# Patient Record
Sex: FEMALE | Race: WHITE | Employment: OTHER | URBAN - METROPOLITAN AREA
[De-identification: names, ages, dates, MRNs, and addresses within clinical notes are randomized per-mention and may not be internally consistent; named-entity substitution may affect disease eponyms.]

---

## 2023-07-14 ENCOUNTER — HOSPITAL ENCOUNTER (OUTPATIENT)
Age: 58
Discharge: HOME | End: 2023-07-14
Payer: MEDICARE

## 2023-07-14 VITALS
DIASTOLIC BLOOD PRESSURE: 78 MMHG | TEMPERATURE: 98 F | SYSTOLIC BLOOD PRESSURE: 133 MMHG | RESPIRATION RATE: 20 BRPM | HEART RATE: 62 BPM | OXYGEN SATURATION: 97 %

## 2023-07-14 DIAGNOSIS — N30.90 CYSTITIS: Primary | ICD-10-CM

## 2023-07-14 LAB
BILIRUB UR QL STRIP: NEGATIVE
COLOR UR: YELLOW
GLUCOSE UR STRIP-MCNC: NEGATIVE MG/DL
HGB UR QL STRIP: NEGATIVE
KETONES UR STRIP-MCNC: NEGATIVE MG/DL
NITRITE UR QL STRIP: NEGATIVE
PH UR STRIP: 6 [PH]
PROT UR STRIP-MCNC: NEGATIVE MG/DL
SP GR UR STRIP: 1.02
UROBILINOGEN UR STRIP-ACNC: <2 MG/DL

## 2023-07-14 PROCEDURE — 87086 URINE CULTURE/COLONY COUNT: CPT | Performed by: NURSE PRACTITIONER

## 2023-07-14 PROCEDURE — 99204 OFFICE O/P NEW MOD 45 MIN: CPT

## 2023-07-14 PROCEDURE — 81002 URINALYSIS NONAUTO W/O SCOPE: CPT

## 2023-07-14 RX ORDER — CEPHALEXIN 500 MG/1
500 CAPSULE ORAL 2 TIMES DAILY
Qty: 14 CAPSULE | Refills: 0 | Status: SHIPPED | OUTPATIENT
Start: 2023-07-14 | End: 2023-07-21

## 2023-07-14 RX ORDER — LEVOTHYROXINE SODIUM 137 MCG
137 TABLET ORAL DAILY
COMMUNITY
Start: 2023-06-01

## 2023-07-14 RX ORDER — VENLAFAXINE HYDROCHLORIDE 150 MG/1
300 CAPSULE, EXTENDED RELEASE ORAL DAILY
COMMUNITY
Start: 2023-06-22

## 2023-07-14 RX ORDER — PHENAZOPYRIDINE HYDROCHLORIDE 100 MG/1
100 TABLET, FILM COATED ORAL 3 TIMES DAILY PRN
Qty: 6 TABLET | Refills: 0 | Status: SHIPPED | OUTPATIENT
Start: 2023-07-14 | End: 2023-07-21

## 2023-07-14 RX ORDER — TRAZODONE HYDROCHLORIDE 300 MG/1
TABLET ORAL
COMMUNITY
Start: 2023-06-21

## 2023-07-14 NOTE — DISCHARGE INSTRUCTIONS
Please take medication as prescribed. Close follow-up is recommended. Any vomiting, fever, please go to the emergency department.

## 2023-07-14 NOTE — ED INITIAL ASSESSMENT (HPI)
Pt c/o urinary frequency/urgency, low back pain and discomfort with urination x 2 weeks. Denies, fever/chills or abdomen pain.

## 2023-09-13 ENCOUNTER — OFFICE VISIT (OUTPATIENT)
Dept: INTERNAL MEDICINE CLINIC | Facility: CLINIC | Age: 58
End: 2023-09-13

## 2023-09-13 VITALS
OXYGEN SATURATION: 98 % | DIASTOLIC BLOOD PRESSURE: 82 MMHG | SYSTOLIC BLOOD PRESSURE: 126 MMHG | HEART RATE: 83 BPM | BODY MASS INDEX: 20.61 KG/M2 | WEIGHT: 136 LBS | HEIGHT: 68 IN

## 2023-09-13 DIAGNOSIS — K58.1 IRRITABLE BOWEL SYNDROME WITH CONSTIPATION: ICD-10-CM

## 2023-09-13 DIAGNOSIS — K21.9 GASTROESOPHAGEAL REFLUX DISEASE, UNSPECIFIED WHETHER ESOPHAGITIS PRESENT: ICD-10-CM

## 2023-09-13 DIAGNOSIS — J30.2 SEASONAL ALLERGIC RHINITIS, UNSPECIFIED TRIGGER: Primary | ICD-10-CM

## 2023-09-13 DIAGNOSIS — J45.40 MODERATE PERSISTENT ASTHMA WITHOUT COMPLICATION: ICD-10-CM

## 2023-09-13 DIAGNOSIS — Z00.00 ENCOUNTER FOR MEDICAL EXAMINATION TO ESTABLISH CARE: ICD-10-CM

## 2023-09-13 PROBLEM — M54.2 NECK PAIN: Status: ACTIVE | Noted: 2022-07-20

## 2023-09-13 PROBLEM — F10.20 ALCOHOL USE DISORDER, MODERATE, DEPENDENCE (HCC): Status: ACTIVE | Noted: 2022-12-29

## 2023-09-13 PROBLEM — N39.0 UTI (URINARY TRACT INFECTION): Status: RESOLVED | Noted: 2018-06-20 | Resolved: 2023-09-13

## 2023-09-13 PROBLEM — R10.9 FLANK PAIN: Status: RESOLVED | Noted: 2018-06-20 | Resolved: 2023-09-13

## 2023-09-13 PROBLEM — E55.9 VITAMIN D DEFICIENCY: Status: RESOLVED | Noted: 2022-07-22 | Resolved: 2023-09-13

## 2023-09-13 PROBLEM — M27.9 LESION OF MANDIBLE: Status: RESOLVED | Noted: 2019-07-01 | Resolved: 2023-09-13

## 2023-09-13 PROBLEM — F41.9 ANXIETY: Status: ACTIVE | Noted: 2018-09-17

## 2023-09-13 PROBLEM — F41.1 GENERALIZED ANXIETY DISORDER: Status: ACTIVE | Noted: 2021-07-22

## 2023-09-13 PROBLEM — F33.1 MAJOR DEPRESSIVE DISORDER, RECURRENT EPISODE, MODERATE (HCC): Chronic | Status: RESOLVED | Noted: 2019-05-13 | Resolved: 2023-09-13

## 2023-09-13 PROBLEM — J34.2 DEVIATED SEPTUM: Status: RESOLVED | Noted: 2020-11-24 | Resolved: 2023-09-13

## 2023-09-13 PROBLEM — R10.84 DIFFUSE ABDOMINAL PAIN: Status: RESOLVED | Noted: 2018-06-20 | Resolved: 2023-09-13

## 2023-09-13 PROBLEM — M54.50 ACUTE BILATERAL LOW BACK PAIN WITHOUT SCIATICA: Status: ACTIVE | Noted: 2022-07-20

## 2023-09-13 PROBLEM — F32.A DEPRESSIVE DISORDER: Status: ACTIVE | Noted: 2018-05-18

## 2023-09-13 PROBLEM — F43.10 PTSD (POST-TRAUMATIC STRESS DISORDER): Status: ACTIVE | Noted: 2021-07-22

## 2023-09-13 PROBLEM — F12.10 CANNABIS ABUSE: Status: ACTIVE | Noted: 2023-01-25

## 2023-09-13 PROCEDURE — 3074F SYST BP LT 130 MM HG: CPT

## 2023-09-13 PROCEDURE — 3079F DIAST BP 80-89 MM HG: CPT

## 2023-09-13 PROCEDURE — 99204 OFFICE O/P NEW MOD 45 MIN: CPT

## 2023-09-13 PROCEDURE — 3008F BODY MASS INDEX DOCD: CPT

## 2023-09-13 RX ORDER — ALPRAZOLAM 0.5 MG/1
0.5 TABLET ORAL NIGHTLY
COMMUNITY

## 2023-09-13 RX ORDER — OMEPRAZOLE 40 MG/1
40 CAPSULE, DELAYED RELEASE ORAL DAILY
COMMUNITY
End: 2023-09-13

## 2023-09-13 RX ORDER — ALBUTEROL SULFATE 90 UG/1
2 AEROSOL, METERED RESPIRATORY (INHALATION) EVERY 6 HOURS PRN
Qty: 8 G | Refills: 1 | Status: SHIPPED | OUTPATIENT
Start: 2023-09-13

## 2023-09-13 RX ORDER — ALBUTEROL SULFATE 90 UG/1
2 AEROSOL, METERED RESPIRATORY (INHALATION) EVERY 6 HOURS PRN
COMMUNITY
End: 2023-09-13

## 2023-09-13 RX ORDER — DICYCLOMINE HYDROCHLORIDE 10 MG/1
1 CAPSULE ORAL 4 TIMES DAILY PRN
COMMUNITY
Start: 2022-07-13 | End: 2023-09-13

## 2023-09-13 RX ORDER — BUSPIRONE HYDROCHLORIDE 10 MG/1
10 TABLET ORAL 3 TIMES DAILY
COMMUNITY
Start: 2023-08-16

## 2023-09-13 RX ORDER — FLUTICASONE PROPIONATE 50 MCG
2 SPRAY, SUSPENSION (ML) NASAL 2 TIMES DAILY
Qty: 15.8 ML | Refills: 1 | Status: SHIPPED | OUTPATIENT
Start: 2023-09-13

## 2023-09-13 RX ORDER — DICYCLOMINE HYDROCHLORIDE 10 MG/1
10 CAPSULE ORAL 4 TIMES DAILY PRN
Qty: 120 CAPSULE | Refills: 1 | Status: SHIPPED | OUTPATIENT
Start: 2023-09-13

## 2023-09-13 RX ORDER — ARIPIPRAZOLE 5 MG/1
5 TABLET ORAL DAILY
COMMUNITY

## 2023-09-13 RX ORDER — OMEPRAZOLE 40 MG/1
40 CAPSULE, DELAYED RELEASE ORAL DAILY
Qty: 90 CAPSULE | Refills: 0 | Status: SHIPPED | OUTPATIENT
Start: 2023-09-13

## 2023-09-13 RX ORDER — FLUTICASONE PROPIONATE 50 MCG
2 SPRAY, SUSPENSION (ML) NASAL 2 TIMES DAILY
COMMUNITY
End: 2023-09-13

## 2023-09-14 RX ORDER — DICYCLOMINE HYDROCHLORIDE 10 MG/1
10 CAPSULE ORAL 4 TIMES DAILY PRN
Qty: 360 CAPSULE | Refills: 0 | OUTPATIENT
Start: 2023-09-14

## 2023-09-15 ENCOUNTER — TELEPHONE (OUTPATIENT)
Dept: FAMILY MEDICINE CLINIC | Facility: CLINIC | Age: 58
End: 2023-09-15

## 2023-09-15 DIAGNOSIS — J01.90 ACUTE RHINOSINUSITIS: Primary | ICD-10-CM

## 2023-09-15 RX ORDER — AMOXICILLIN AND CLAVULANATE POTASSIUM 875; 125 MG/1; MG/1
1 TABLET, FILM COATED ORAL 2 TIMES DAILY
Qty: 14 TABLET | Refills: 0 | Status: SHIPPED | OUTPATIENT
Start: 2023-09-15 | End: 2023-09-22

## 2023-09-23 ENCOUNTER — LAB ENCOUNTER (OUTPATIENT)
Dept: LAB | Age: 58
End: 2023-09-23
Payer: MEDICARE

## 2023-09-23 DIAGNOSIS — Z00.00 ENCOUNTER FOR MEDICAL EXAMINATION TO ESTABLISH CARE: ICD-10-CM

## 2023-09-23 LAB
ALBUMIN SERPL-MCNC: 3.9 G/DL (ref 3.4–5)
ALBUMIN/GLOB SERPL: 0.9 {RATIO} (ref 1–2)
ALP LIVER SERPL-CCNC: 86 U/L
ALT SERPL-CCNC: 40 U/L
ANION GAP SERPL CALC-SCNC: 7 MMOL/L (ref 0–18)
AST SERPL-CCNC: 33 U/L (ref 15–37)
BASOPHILS # BLD AUTO: 0.04 X10(3) UL (ref 0–0.2)
BASOPHILS NFR BLD AUTO: 0.8 %
BILIRUB SERPL-MCNC: 0.3 MG/DL (ref 0.1–2)
BUN BLD-MCNC: 13 MG/DL (ref 7–18)
BUN/CREAT SERPL: 13.7 (ref 10–20)
CALCIUM BLD-MCNC: 9.2 MG/DL (ref 8.5–10.1)
CHLORIDE SERPL-SCNC: 107 MMOL/L (ref 98–112)
CHOLEST SERPL-MCNC: 268 MG/DL (ref ?–200)
CO2 SERPL-SCNC: 28 MMOL/L (ref 21–32)
CREAT BLD-MCNC: 0.95 MG/DL
DEPRECATED RDW RBC AUTO: 45.9 FL (ref 35.1–46.3)
EGFRCR SERPLBLD CKD-EPI 2021: 69 ML/MIN/1.73M2 (ref 60–?)
EOSINOPHIL # BLD AUTO: 0.15 X10(3) UL (ref 0–0.7)
EOSINOPHIL NFR BLD AUTO: 2.9 %
ERYTHROCYTE [DISTWIDTH] IN BLOOD BY AUTOMATED COUNT: 12.9 % (ref 11–15)
FASTING PATIENT LIPID ANSWER: YES
FASTING STATUS PATIENT QL REPORTED: YES
GLOBULIN PLAS-MCNC: 4.2 G/DL (ref 2.8–4.4)
GLUCOSE BLD-MCNC: 97 MG/DL (ref 70–99)
HCT VFR BLD AUTO: 43.1 %
HDLC SERPL-MCNC: 72 MG/DL (ref 40–59)
HGB BLD-MCNC: 14.2 G/DL
IMM GRANULOCYTES # BLD AUTO: 0.01 X10(3) UL (ref 0–1)
IMM GRANULOCYTES NFR BLD: 0.2 %
LDLC SERPL CALC-MCNC: 176 MG/DL (ref ?–100)
LYMPHOCYTES # BLD AUTO: 2.66 X10(3) UL (ref 1–4)
LYMPHOCYTES NFR BLD AUTO: 52.2 %
MCH RBC QN AUTO: 32.2 PG (ref 26–34)
MCHC RBC AUTO-ENTMCNC: 32.9 G/DL (ref 31–37)
MCV RBC AUTO: 97.7 FL
MONOCYTES # BLD AUTO: 0.39 X10(3) UL (ref 0.1–1)
MONOCYTES NFR BLD AUTO: 7.6 %
NEUTROPHILS # BLD AUTO: 1.85 X10 (3) UL (ref 1.5–7.7)
NEUTROPHILS # BLD AUTO: 1.85 X10(3) UL (ref 1.5–7.7)
NEUTROPHILS NFR BLD AUTO: 36.3 %
NONHDLC SERPL-MCNC: 196 MG/DL (ref ?–130)
OSMOLALITY SERPL CALC.SUM OF ELEC: 294 MOSM/KG (ref 275–295)
PLATELET # BLD AUTO: 230 10(3)UL (ref 150–450)
POTASSIUM SERPL-SCNC: 4.1 MMOL/L (ref 3.5–5.1)
PROT SERPL-MCNC: 8.1 G/DL (ref 6.4–8.2)
RBC # BLD AUTO: 4.41 X10(6)UL
SODIUM SERPL-SCNC: 142 MMOL/L (ref 136–145)
TRIGL SERPL-MCNC: 117 MG/DL (ref 30–149)
TSI SER-ACNC: 2.62 MIU/ML (ref 0.36–3.74)
VLDLC SERPL CALC-MCNC: 24 MG/DL (ref 0–30)
WBC # BLD AUTO: 5.1 X10(3) UL (ref 4–11)

## 2023-09-23 PROCEDURE — 84443 ASSAY THYROID STIM HORMONE: CPT

## 2023-09-23 PROCEDURE — 85025 COMPLETE CBC W/AUTO DIFF WBC: CPT

## 2023-09-23 PROCEDURE — 80061 LIPID PANEL: CPT

## 2023-09-23 PROCEDURE — 36415 COLL VENOUS BLD VENIPUNCTURE: CPT

## 2023-09-23 PROCEDURE — 80053 COMPREHEN METABOLIC PANEL: CPT

## 2023-09-29 ENCOUNTER — OFFICE VISIT (OUTPATIENT)
Dept: INTERNAL MEDICINE CLINIC | Facility: CLINIC | Age: 58
End: 2023-09-29

## 2023-09-29 VITALS
OXYGEN SATURATION: 98 % | HEIGHT: 68 IN | SYSTOLIC BLOOD PRESSURE: 123 MMHG | HEART RATE: 77 BPM | DIASTOLIC BLOOD PRESSURE: 85 MMHG | WEIGHT: 137 LBS | BODY MASS INDEX: 20.76 KG/M2

## 2023-09-29 DIAGNOSIS — Z12.31 SCREENING MAMMOGRAM FOR BREAST CANCER: ICD-10-CM

## 2023-09-29 DIAGNOSIS — Z00.00 ROUTINE GENERAL MEDICAL EXAMINATION AT A HEALTH CARE FACILITY: Primary | ICD-10-CM

## 2023-09-29 DIAGNOSIS — K21.9 GASTROESOPHAGEAL REFLUX DISEASE, UNSPECIFIED WHETHER ESOPHAGITIS PRESENT: ICD-10-CM

## 2023-09-29 DIAGNOSIS — Z23 NEED FOR VACCINATION: ICD-10-CM

## 2023-09-29 DIAGNOSIS — E78.00 PURE HYPERCHOLESTEROLEMIA: ICD-10-CM

## 2023-09-29 PROCEDURE — 99396 PREV VISIT EST AGE 40-64: CPT

## 2023-09-29 PROCEDURE — 90471 IMMUNIZATION ADMIN: CPT

## 2023-09-29 PROCEDURE — 3079F DIAST BP 80-89 MM HG: CPT

## 2023-09-29 PROCEDURE — 3074F SYST BP LT 130 MM HG: CPT

## 2023-09-29 PROCEDURE — 90472 IMMUNIZATION ADMIN EACH ADD: CPT

## 2023-09-29 PROCEDURE — 90677 PCV20 VACCINE IM: CPT

## 2023-09-29 PROCEDURE — 90750 HZV VACC RECOMBINANT IM: CPT

## 2023-09-29 PROCEDURE — 3008F BODY MASS INDEX DOCD: CPT

## 2023-09-29 RX ORDER — ROSUVASTATIN CALCIUM 5 MG/1
5 TABLET, COATED ORAL NIGHTLY
Qty: 90 TABLET | Refills: 0 | Status: SHIPPED | OUTPATIENT
Start: 2023-09-29

## 2023-09-29 RX ORDER — OMEPRAZOLE 40 MG/1
40 CAPSULE, DELAYED RELEASE ORAL DAILY
Qty: 90 CAPSULE | Refills: 0 | Status: CANCELLED | OUTPATIENT
Start: 2023-09-29

## 2023-10-04 ENCOUNTER — NURSE TRIAGE (OUTPATIENT)
Dept: INTERNAL MEDICINE CLINIC | Facility: CLINIC | Age: 58
End: 2023-10-04

## 2023-10-04 ENCOUNTER — TELEMEDICINE (OUTPATIENT)
Dept: INTERNAL MEDICINE CLINIC | Facility: CLINIC | Age: 58
End: 2023-10-04

## 2023-10-04 DIAGNOSIS — T50.B95A: Primary | ICD-10-CM

## 2023-10-04 PROCEDURE — 99213 OFFICE O/P EST LOW 20 MIN: CPT

## 2023-10-04 RX ORDER — DIPHENHYDRAMINE HYDROCHLORIDE, ZINC ACETATE 2; .1 G/100G; G/100G
1 CREAM TOPICAL 3 TIMES DAILY PRN
Qty: 28 G | Refills: 0 | Status: SHIPPED | OUTPATIENT
Start: 2023-10-04

## 2023-10-04 RX ORDER — METHYLPREDNISOLONE 4 MG/1
TABLET ORAL
Qty: 1 EACH | Refills: 0 | Status: SHIPPED | OUTPATIENT
Start: 2023-10-04

## 2023-10-04 NOTE — TELEPHONE ENCOUNTER
Action Requested: Summary for Provider     []  Critical Lab, Recommendations Needed  [] Need Additional Advice  []   FYI    []   Need Orders  [] Need Medications Sent to Pharmacy  []  Other     SUMMARY: Per protocol: OV. Patient does not have a car. Virtual visit made. Future Appointments   Date Time Provider Shivam Francisca   10/4/2023  6:00 PM JEROME Chiu [facility]     Reason for call: Rash  Onset: Data Unavailable      Patient had both shingles and a pneumonia vaccine done on 9/29/23. There is now a rash from her upper arm to her elbow joint. The rash is spreading daily. It is painful and it itches. She denies fever or shortness of breath.      Reason for Disposition   Patient wants to be seen    Protocols used: Rash or Redness - [id number]

## 2023-10-04 NOTE — PROGRESS NOTES
This is a telemedicine visit with live, interactive video and audio. Patient understands and accepts financial responsibility for any deductible, co-insurance and/or co-pays associated with this service. SUBJECTIVE  Developed a local reaction to the shingles vaccine- also got the pneumonia vaccine in the same arm  It is painful and itchy, red and raised  The redness is spreading down to elbow   Pain is about 8/10 at the worst  She has been icing it and using tylenol and ibuprofen without much relief  No fevers  Had an occasional chill    HISTORY:  Past Medical History:   Diagnosis Date    Cholecystitis     Colitis     Deviated septum 11/24/2020    Diffuse abdominal pain 06/20/2018    Last Assessment & Plan: Formatting of this note might be different from the original. Patient with chronic abdominal pain with negative workup in the past including EGD in 2015. Will prescribe Dicyclomine as suspect there may be some degree of IBS.  Will also treat her GERD as below    Diverticulitis     Hernia     History of IBS     Lesion of mandible 07/01/2019    UTI (urinary tract infection) 06/20/2018      Past Surgical History:   Procedure Laterality Date    CHOLECYSTECTOMY      HYSTERECTOMY      TONSILLECTOMY        Family History   Problem Relation Age of Onset    Other (cervical cancer) Mother     Other (HIV) Father     Other (addiction) Father     Breast Cancer Paternal Grandmother     Cancer Son       Social History     Socioeconomic History    Marital status:    Tobacco Use    Smoking status: Every Day     Packs/day: .5     Types: Cigarettes   Vaping Use    Vaping Use: Never used   Substance and Sexual Activity    Alcohol use: Never    Drug use: Never   Social History Narrative    ** Merged History Encounter **               Quetiapine              NAUSEA AND VOMITING    Comment:Heartburn  Morphine                ITCHING  Acetaminophen-Codei*    ITCHING   Current Outpatient Medications   Medication Sig Dispense Refill    diphenhydrAMINE-zinc (BENADRYL EXTRA STRENGTH) 2-0.1 % External Cream Apply 1 Application topically 3 (three) times daily as needed for Itching. 28 g 0    methylPREDNISolone (MEDROL) 4 MG Oral Tablet Therapy Pack As directed. 1 each 0    rosuvastatin 5 MG Oral Tab Take 1 tablet (5 mg total) by mouth nightly. 90 tablet 0    ALPRAZolam 0.5 MG Oral Tab Take 1 tablet (0.5 mg total) by mouth nightly. TAKE AT BEDTIME      busPIRone 10 MG Oral Tab Take 1 tablet (10 mg total) by mouth 3 (three) times daily. dicyclomine 10 MG Oral Cap Take 1 capsule (10 mg total) by mouth 4 (four) times daily as needed. 120 capsule 1    Omeprazole 40 MG Oral Capsule Delayed Release Take 1 capsule (40 mg total) by mouth daily. 90 capsule 0    albuterol 108 (90 Base) MCG/ACT Inhalation Aero Soln Inhale 2 puffs into the lungs every 6 (six) hours as needed for Wheezing. 8 g 1    fluticasone propionate 50 MCG/ACT Nasal Suspension 2 sprays by Nasal route 2 (two) times daily. 15.8 mL 1    SYNTHROID 137 MCG Oral Tab Take 137 mcg by mouth daily. traZODone HCl 300 MG Oral Tab       venlafaxine  MG Oral Capsule SR 24 Hr Take 2 capsules (300 mg total) by mouth daily. OBJECTIVE  Physical Exam:   alert, appears stated age, cooperative, and no distress  Raised red rash from deltoid down the arm close to the elbow    ASSESSMENT & PLAN  Diagnoses and all orders for this visit:    Adverse reaction to viral vaccines, initial encounter  -     diphenhydrAMINE-zinc (BENADRYL EXTRA STRENGTH) 2-0.1 % External Cream; Apply 1 Application topically 3 (three) times daily as needed for Itching.  -     methylPREDNISolone (MEDROL) 4 MG Oral Tablet Therapy Pack; As directed. Make a follow up appointment on Friday in person. If she develops signs of infection should be seen sooner - discussed these symptoms.      Ha Life, APRN

## 2023-10-06 ENCOUNTER — OFFICE VISIT (OUTPATIENT)
Dept: INTERNAL MEDICINE CLINIC | Facility: CLINIC | Age: 58
End: 2023-10-06

## 2023-10-06 VITALS
SYSTOLIC BLOOD PRESSURE: 130 MMHG | HEIGHT: 68 IN | WEIGHT: 134 LBS | BODY MASS INDEX: 20.31 KG/M2 | HEART RATE: 76 BPM | OXYGEN SATURATION: 96 % | DIASTOLIC BLOOD PRESSURE: 82 MMHG

## 2023-10-06 DIAGNOSIS — L29.8: ICD-10-CM

## 2023-10-06 DIAGNOSIS — T50.Z95A: ICD-10-CM

## 2023-10-06 DIAGNOSIS — T50.B95A: Primary | ICD-10-CM

## 2023-10-06 PROCEDURE — 99214 OFFICE O/P EST MOD 30 MIN: CPT

## 2023-10-06 RX ORDER — HYDROXYZINE HYDROCHLORIDE 25 MG/1
25 TABLET, FILM COATED ORAL 3 TIMES DAILY PRN
Qty: 21 TABLET | Refills: 0 | Status: SHIPPED | OUTPATIENT
Start: 2023-10-06

## 2023-12-19 DIAGNOSIS — K21.9 GASTROESOPHAGEAL REFLUX DISEASE, UNSPECIFIED WHETHER ESOPHAGITIS PRESENT: ICD-10-CM

## 2023-12-20 RX ORDER — OMEPRAZOLE 40 MG/1
40 CAPSULE, DELAYED RELEASE ORAL DAILY
Qty: 90 CAPSULE | Refills: 3 | Status: SHIPPED | OUTPATIENT
Start: 2023-12-20

## 2023-12-20 NOTE — TELEPHONE ENCOUNTER
Refill passed per [facility] protocol.   Requested Prescriptions   Pending Prescriptions Disp Refills    OMEPRAZOLE 40 MG Oral Capsule Delayed Release [Pharmacy Med Name: OMEPRAZOLE 40MG CAPSULES] 90 capsule 0     Sig: TAKE 1 CAPSULE(40 MG) BY MOUTH DAILY       Gastrointestional Medication Protocol Passed - 12/19/2023  2:45 PM        Passed - In person appointment or virtual visit in the past 12 mos or appointment in next 3 mos     Recent Outpatient Visits              2 months ago Adverse reaction to viral vaccines, initial encounter    Ashleigh Leiva, [facility], APRN    Office Visit    2 months ago Adverse reaction to viral vaccines, initial encounter    Ashleigh Leiva, Celine Cohen, APRN    Telemedicine    2 months ago Routine general medical examination at a health care facility    Teri Sloan [address] [place] [address], [address] [place] [address],[address], [facility], APRN    Office Visit    3 months ago Seasonal allergic rhinitis, unspecified trigger    Mohamud Coyle, APRN    Office Visit                         Recent Outpatient Visits              2 months ago Adverse reaction to viral vaccines, initial encounter    Teri Sloan, [address] [facility], [address] [facility] [address] [facility] [address], APRN    Office Visit    2 months ago Adverse reaction to viral vaccines, initial encounter    Teri Sloan [address] [place], [place] [address]Mohamud manuel Sarah, APRN    Telemedicine    2 months ago Routine general medical examination at a health care facility    Teri Sloan [address] [facility], [address] [facility],[address], [address] [facility], APRN    Office Visit    3 months ago Seasonal allergic rhinitis, unspecified trigger    Ashleigh Leiva, [address] [place] [address],[address], [address] [facility], APRN    Office Visit

## 2024-01-08 DIAGNOSIS — E78.00 PURE HYPERCHOLESTEROLEMIA: ICD-10-CM

## 2024-01-09 NOTE — TELEPHONE ENCOUNTER
Please Review. Protocol Failed or has no protocol.     Last visit - rosuvastatin was started       Requested Prescriptions   Pending Prescriptions Disp Refills    ROSUVASTATIN 5 MG Oral Tab [Pharmacy Med Name: ROSUVASTATIN 5MG TABLETS] 90 tablet 0     Sig: TAKE 1 TABLET(5 MG) BY MOUTH EVERY NIGHT       Cholesterol Medication Protocol Failed - 1/8/2024  8:34 AM        Failed - Last LDL < 130     Lab Results   Component Value Date     (H) 09/23/2023             Passed - ALT in past 12 months        Passed - LDL in past 12 months        Passed - Last ALT < 80     Lab Results   Component Value Date    ALT 40 09/23/2023             Passed - In person appointment or virtual visit in the past 12 mos or appointment in next 3 mos     Recent Outpatient Visits              3 months ago Adverse reaction to viral vaccines, initial encounter    [facility]    Office Visit    3 months ago Adverse reaction to viral vaccines, initial encounter    [facility]    Telemedicine    3 months ago Routine general medical examination at a health care facility    [facility]    Office Visit    3 months ago Seasonal allergic rhinitis, unspecified trigger    [facility]    Office Visit                         Recent Outpatient Visits              3 months ago Adverse reaction to viral vaccines, initial encounter    [facility]    Office Visit    3 months ago Adverse reaction to viral vaccines, initial encounter    [facility]    Telemedicine    3 months ago Routine general medical examination at a health care facility    [facility]  Charles, Celine Hoover APRN    Office Visit    3 months ago Seasonal allergic rhinitis, unspecified trigger    [facility], [facility] [place], Celine Hoover APRN    Office Visit

## 2024-01-10 RX ORDER — ROSUVASTATIN CALCIUM 5 MG/1
5 TABLET, COATED ORAL NIGHTLY
Qty: 90 TABLET | Refills: 0 | Status: SHIPPED | OUTPATIENT
Start: 2024-01-10

## 2024-04-10 ENCOUNTER — NURSE TRIAGE (OUTPATIENT)
Dept: FAMILY MEDICINE CLINIC | Facility: CLINIC | Age: 59
End: 2024-04-10

## 2024-04-10 ENCOUNTER — TELEPHONE (OUTPATIENT)
Dept: FAMILY MEDICINE CLINIC | Facility: CLINIC | Age: 59
End: 2024-04-10

## 2024-04-10 DIAGNOSIS — E78.00 PURE HYPERCHOLESTEROLEMIA: ICD-10-CM

## 2024-04-10 NOTE — TELEPHONE ENCOUNTER
Patient was triaged today she has an appointment today in [facility].    Future Appointments   Date Time Provider Department Center   4/10/2024  3:20 PM [facility]

## 2024-04-10 NOTE — TELEPHONE ENCOUNTER
Patient called to request an appointment with Dr. Miles this week if possible.     Patient advised she  has back pain and trouble with her hands.     Patient states she currently has access to a car and will like to come in this week, if possible. Refusing an appointment with a different provider.

## 2024-04-10 NOTE — TELEPHONE ENCOUNTER
Action Requested: Summary for Provider     []  Critical Lab, Recommendations Needed  [] Need Additional Advice  []   FYI    []   Need Orders  [] Need Medications Sent to Pharmacy  []  Other     SUMMARY: Patient called, seeking appointment, states insurance card shows Dr Miles as PCP but she has not seen her.  Patient states history of chronic pain in back.  Reports over past week both hands have severe pain in joints, also has \"bumps\" on right hand.  Hard for her to do daily activities and take care of grandchildren.  Her back is also bothering her more.  Denies acute injury.  Plan:  Appointment to evaluate for acute etiology of joint pain.  Future Appointments   Date Time Provider Department Center   4/10/2024  3:20 PM Stas Branch MD [facility]     Reason for call: Exacerbation of pain in bilateral hands and back  Onset: worse over the past week    Spoke with patient,  verified.   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    Reason for Disposition   MODERATE pain (e.g., interferes with normal activities) and present > 3 days    Protocols used: Hand and Wrist Pain-A-OH

## 2024-04-10 NOTE — TELEPHONE ENCOUNTER
Patient never seen by .   no longer accepting new patients.    Routing to RN triage for triage of symptoms.    GALINA Tracy may have sooner availability   and JEROME Vuong may be other options.

## 2024-04-10 NOTE — TELEPHONE ENCOUNTER
Patient called to request a refill on below medications: [facility] in [place] verified.           Medication Quantity Refills Start End   rosuvastatin 5 MG Oral Tab 90 tablet 0 1/10/2024 --   Sig:   Take 1 tablet (5 mg total) by mouth nightly.     Route:   Oral       Medication Quantity Refills Start End   SYNTHROID 137 MCG Oral Tab -- -- 6/1/2023 --   Sig:   Take 137 mcg by mouth daily.     Route:   Oral     PERNELL:   Yes     Class:   Historical

## 2024-04-11 ENCOUNTER — HOSPITAL ENCOUNTER (OUTPATIENT)
Dept: GENERAL RADIOLOGY | Age: 59
Discharge: HOME | End: 2024-04-11
Attending: STUDENT IN AN ORGANIZED HEALTH CARE EDUCATION/TRAINING PROGRAM
Payer: MEDICARE

## 2024-04-11 ENCOUNTER — OFFICE VISIT (OUTPATIENT)
Dept: INTERNAL MEDICINE CLINIC | Facility: CLINIC | Age: 59
End: 2024-04-11

## 2024-04-11 VITALS
SYSTOLIC BLOOD PRESSURE: 130 MMHG | HEART RATE: 58 BPM | HEIGHT: 68 IN | WEIGHT: 136 LBS | DIASTOLIC BLOOD PRESSURE: 79 MMHG | BODY MASS INDEX: 20.61 KG/M2

## 2024-04-11 DIAGNOSIS — F32.A DEPRESSIVE DISORDER: ICD-10-CM

## 2024-04-11 DIAGNOSIS — M25.561 CHRONIC PAIN OF RIGHT KNEE: ICD-10-CM

## 2024-04-11 DIAGNOSIS — M54.16 LUMBAR BACK PAIN WITH RADICULOPATHY AFFECTING LOWER EXTREMITY: ICD-10-CM

## 2024-04-11 DIAGNOSIS — M54.2 CERVICAL PAIN (NECK): ICD-10-CM

## 2024-04-11 DIAGNOSIS — G89.29 CHRONIC PAIN OF RIGHT KNEE: ICD-10-CM

## 2024-04-11 DIAGNOSIS — M25.50 POLYARTHRALGIA: Primary | ICD-10-CM

## 2024-04-11 DIAGNOSIS — F41.1 GENERALIZED ANXIETY DISORDER: ICD-10-CM

## 2024-04-11 DIAGNOSIS — F43.10 PTSD (POST-TRAUMATIC STRESS DISORDER): ICD-10-CM

## 2024-04-11 DIAGNOSIS — M79.642 PAIN IN BOTH HANDS: ICD-10-CM

## 2024-04-11 DIAGNOSIS — E89.0 POSTABLATIVE HYPOTHYROIDISM: ICD-10-CM

## 2024-04-11 DIAGNOSIS — M54.41 CHRONIC BILATERAL LOW BACK PAIN WITH BILATERAL SCIATICA: ICD-10-CM

## 2024-04-11 DIAGNOSIS — G56.03 BILATERAL CARPAL TUNNEL SYNDROME: ICD-10-CM

## 2024-04-11 DIAGNOSIS — M79.641 PAIN IN BOTH HANDS: ICD-10-CM

## 2024-04-11 DIAGNOSIS — M53.3 SACROILIAC PAIN: ICD-10-CM

## 2024-04-11 DIAGNOSIS — G89.29 CHRONIC BILATERAL LOW BACK PAIN WITH BILATERAL SCIATICA: ICD-10-CM

## 2024-04-11 DIAGNOSIS — M54.42 CHRONIC BILATERAL LOW BACK PAIN WITH BILATERAL SCIATICA: ICD-10-CM

## 2024-04-11 PROBLEM — E05.00 GRAVES DISEASE: Status: ACTIVE | Noted: 2024-04-11

## 2024-04-11 PROCEDURE — 99215 OFFICE O/P EST HI 40 MIN: CPT | Performed by: STUDENT IN AN ORGANIZED HEALTH CARE EDUCATION/TRAINING PROGRAM

## 2024-04-11 PROCEDURE — 96127 BRIEF EMOTIONAL/BEHAV ASSMT: CPT | Performed by: STUDENT IN AN ORGANIZED HEALTH CARE EDUCATION/TRAINING PROGRAM

## 2024-04-11 PROCEDURE — 72050 X-RAY EXAM NECK SPINE 4/5VWS: CPT | Performed by: STUDENT IN AN ORGANIZED HEALTH CARE EDUCATION/TRAINING PROGRAM

## 2024-04-11 PROCEDURE — 73562 X-RAY EXAM OF KNEE 3: CPT | Performed by: STUDENT IN AN ORGANIZED HEALTH CARE EDUCATION/TRAINING PROGRAM

## 2024-04-11 PROCEDURE — 72072 X-RAY EXAM THORAC SPINE 3VWS: CPT | Performed by: STUDENT IN AN ORGANIZED HEALTH CARE EDUCATION/TRAINING PROGRAM

## 2024-04-11 PROCEDURE — 72110 X-RAY EXAM L-2 SPINE 4/>VWS: CPT | Performed by: STUDENT IN AN ORGANIZED HEALTH CARE EDUCATION/TRAINING PROGRAM

## 2024-04-11 PROCEDURE — 72202 X-RAY EXAM SI JOINTS 3/> VWS: CPT | Performed by: STUDENT IN AN ORGANIZED HEALTH CARE EDUCATION/TRAINING PROGRAM

## 2024-04-11 PROCEDURE — 99499 UNLISTED E&M SERVICE: CPT | Performed by: STUDENT IN AN ORGANIZED HEALTH CARE EDUCATION/TRAINING PROGRAM

## 2024-04-11 RX ORDER — DICLOFENAC SODIUM 75 MG/1
75 TABLET, DELAYED RELEASE ORAL 2 TIMES DAILY
Qty: 60 TABLET | Refills: 0 | Status: SHIPPED | OUTPATIENT
Start: 2024-04-11 | End: 2024-04-11

## 2024-04-11 RX ORDER — METHYLPREDNISOLONE 4 MG/1
TABLET ORAL
Qty: 1 EACH | Refills: 0 | Status: SHIPPED | OUTPATIENT
Start: 2024-04-11

## 2024-04-11 RX ORDER — BACLOFEN 10 MG/1
10 TABLET ORAL 3 TIMES DAILY PRN
Qty: 90 TABLET | Refills: 0 | Status: SHIPPED | OUTPATIENT
Start: 2024-04-11 | End: 2024-05-11

## 2024-04-11 RX ORDER — ROSUVASTATIN CALCIUM 5 MG/1
5 TABLET, COATED ORAL NIGHTLY
Qty: 90 TABLET | Refills: 3 | Status: SHIPPED | OUTPATIENT
Start: 2024-04-11

## 2024-04-11 RX ORDER — NAPROXEN 500 MG/1
500 TABLET ORAL 2 TIMES DAILY WITH MEALS
Qty: 60 TABLET | Refills: 0 | Status: SHIPPED | OUTPATIENT
Start: 2024-04-11 | End: 2024-05-11

## 2024-04-11 NOTE — TELEPHONE ENCOUNTER
REFILL PASSED PER [facility] PROTOCOLS  Last office visit 4/11/2024  Requested Prescriptions   Pending Prescriptions Disp Refills    rosuvastatin 5 MG Oral Tab 90 tablet 0     Sig: Take 1 tablet (5 mg total) by mouth nightly.       Cholesterol Medication Protocol Failed - 4/10/2024 11:07 AM        Failed - In person appointment or virtual visit in the past 12 mos or appointment in next 3 mos     Recent Outpatient Visits              Today Chronic bilateral low back pain with bilateral sciatica    [facility], Jeb Burnette MD    Office Visit    6 months ago Adverse reaction to viral vaccines, initial encounter    [facility]    Office Visit    6 months ago Adverse reaction to viral vaccines, initial encounter    [facility]    Telemedicine    6 months ago Routine general medical examination at a health care facility    [facility]    Office Visit    7 months ago Seasonal allergic rhinitis, unspecified trigger    [facility]    Office Visit          Future Appointments         Provider Department Appt Notes    Today [facility] XR RM1 [facility] X-ray - [place]     Today [facility] XR RM1 [facility] X-ray - [place]     In 1 week Nilesh Zeng PA [facility], [facility] NP Chronic bilateral low back pain with bilateral sciatica  Bilateral carpal tunnel syndrome     Ref by JEB STONER                    Passed - ALT < 80     Lab Results   Component Value Date    ALT 40 09/23/2023             Passed - ALT resulted within past year        Passed - Lipid panel within past 12 months     Lab Results   Component Value Date    CHOLEST 268 (H) 09/23/2023    TRIG 117 09/23/2023    HDL 72 (H)  09/23/2023     (H) 09/23/2023    VLDL 24 09/23/2023    NONHDLC 196 (H) 09/23/2023               SYNTHROID 137 MCG Oral Tab 90 tablet 0     Sig: Take 137 mcg by mouth daily.       Thyroid Medication Protocol Failed - 4/10/2024 11:07 AM        Failed - In person appointment or virtual visit in the past 12 mos or appointment in next 3 mos     Recent Outpatient Visits              Today Chronic bilateral low back pain with bilateral sciatica    [facility] Jeb Burnette MD    Office Visit    6 months ago Adverse reaction to viral vaccines, initial encounter    [facility]    Office Visit    6 months ago Adverse reaction to viral vaccines, initial encounter    [facility], [facility], Celine Hoover APRN    Telemedicine    6 months ago Routine general medical examination at a health care facility    [facility]    Office Visit    7 months ago Seasonal allergic rhinitis, unspecified trigger    [facility]    [facility]          Future Appointments         Provider Department Appt Notes    Today [facility] XR RM1 [facility] X-ray - [place]     Today [facility] XR RM1 [facility] X-ray - [place]     In 1 week Nilesh Zeng PA [facility], [facility] NP Chronic bilateral low back pain with bilateral sciatica  Bilateral carpal tunnel syndrome     Ref by JEB STONER                    Passed - TSH in past 12 months        Passed - Last TSH value is normal     Lab Results   Component Value Date    TSH 2.620 09/23/2023                      Future Appointments         Provider Department Appt Notes    Today [facility] XR RM1 [facility] X-ray - [place]     Today [facility] XR RM1 [facility] X-ray - [place]     In 1 week  Nilesh Zeng PA [facility], [facility] Chronic bilateral low back pain with bilateral sciatica  Bilateral carpal tunnel syndrome     Ref by STAS STONER          Recent Outpatient Visits              Today Chronic bilateral low back pain with bilateral sciatica    [facility], [facility] Stas Stoner MD    Office Visit    6 months ago Adverse reaction to viral vaccines, initial encounter    [facility]    Office Visit    6 months ago Adverse reaction to viral vaccines, initial encounter    [facility]    Telemedicine    6 months ago Routine general medical examination at a health care facility    [facility]    Office Visit    7 months ago Seasonal allergic rhinitis, unspecified trigger    [facility]    Office Visit

## 2024-04-11 NOTE — PROGRESS NOTES
OFFICE NOTE     Patient ID: Iliana Dove is a 58 year old female.  Today's Date: 04/11/24  Chief Complaint: Back Pain, Hand Pain (Pt states B/l hand pain with bumps and sharp pain with any movement), and Shoulder Pain (R shoudler pain)    Pt romeo 57y/o female with PMHx alcohol use disorder, current tobacco smoker (former cannabis use), HLD, history of graves disease s/p radioactive ablation 1990 with now hypothyroidism on synthroid 137mcg, HLD, Moderate asthma, MDD/KWESI, PTSD,  GERD, neck pain and lower back pain followed by pain management in distant past) whom presents to clinic with chief complaing of pain of both hads and severe pain in joints and bumps on right hand and difficult for daily activities and also back pain is worsening than usual. And noted lumps on palm of hands. Pt states she has right knee pain as well. Pain is worse throughout the day and night, but her trazadone helps her sleep  Pt has carpal tunnel and never got surgery done      1 carton a month ( recent 3 cig      Vitals:    04/11/24 1042   BP: 130/79   Pulse: 58   Weight: 136 lb (61.7 kg)   Height: 5' 8\" (1.727 m)     body mass index is 20.68 kg/m².  BP Readings from Last 3 Encounters:   04/11/24 130/79   10/06/23 130/82   09/29/23 123/85     The 10-year ASCVD risk score (Patti MIKE, et al., 2019) is: 6.9%    Values used to calculate the score:      Age: 58 years      Sex: Female      Is Non- : No      Diabetic: No      Tobacco smoker: Yes      Systolic Blood Pressure: 130 mmHg      Is BP treated: No      HDL Cholesterol: 72 mg/dL      Total Cholesterol: 268 mg/dL      Medications reviewed:  Current Outpatient Medications   Medication Sig Dispense Refill    naproxen 500 MG Oral Tab Take 1 tablet (500 mg total) by mouth 2 (two) times daily with meals. 60 tablet 0    baclofen 10 MG Oral Tab Take 1 tablet (10 mg total) by mouth 3 (three) times daily as needed. 90 tablet 0    methylPREDNISolone 4 MG  Oral Tablet Therapy Pack Take as directed with food. 1 each 0    Omeprazole 40 MG Oral Capsule Delayed Release Take 1 capsule (40 mg total) by mouth daily. 90 capsule 3    hydrOXYzine 25 MG Oral Tab Take 1 tablet (25 mg total) by mouth 3 (three) times daily as needed for Itching. 21 tablet 0    ALPRAZolam 0.5 MG Oral Tab Take 1 tablet (0.5 mg total) by mouth nightly. TAKE AT BEDTIME      busPIRone 10 MG Oral Tab Take 1 tablet (10 mg total) by mouth 3 (three) times daily.      dicyclomine 10 MG Oral Cap Take 1 capsule (10 mg total) by mouth 4 (four) times daily as needed. 120 capsule 1    albuterol 108 (90 Base) MCG/ACT Inhalation Aero Soln Inhale 2 puffs into the lungs every 6 (six) hours as needed for Wheezing. 8 g 1    fluticasone propionate 50 MCG/ACT Nasal Suspension 2 sprays by Nasal route 2 (two) times daily. 15.8 mL 1    SYNTHROID 137 MCG Oral Tab Take 137 mcg by mouth daily.      traZODone HCl 300 MG Oral Tab       rosuvastatin 5 MG Oral Tab Take 1 tablet (5 mg total) by mouth nightly. 90 tablet 3    diphenhydrAMINE-zinc (BENADRYL EXTRA STRENGTH) 2-0.1 % External Cream Apply 1 Application topically 3 (three) times daily as needed for Itching. (Patient not taking: Reported on 4/11/2024) 28 g 0    methylPREDNISolone (MEDROL) 4 MG Oral Tablet Therapy Pack As directed. (Patient not taking: Reported on 4/11/2024) 1 each 0    venlafaxine  MG Oral Capsule SR 24 Hr Take 2 capsules (300 mg total) by mouth daily. (Patient not taking: Reported on 4/11/2024)           Assessment & Plan    1. Polyarthralgia (Primary)  -     Comp Metabolic Panel (14); Future; Expected date: 04/11/2024  -     C-Reactive Protein; Future; Expected date: 04/11/2024  -     Sed Mayco Navas (Automated); Future; Expected date: 04/11/2024  -     HLA B 27 Disease Association; Future; Expected date: 04/11/2024  -     PHYSIATRY - INTERNAL  -     XR SACROILIAC JOINTS (MIN 3 VIEWS) (CPT=72202); Future; Expected date: 04/11/2024  -     XR  CERVICAL SPINE (4VIEWS) (CPT=72050); Future; Expected date: 04/11/2024  -     XR LUMBAR SPINE (MIN 4 VIEWS) (CPT=72110); Future; Expected date: 04/11/2024  -     PHYSICAL THERAPY - INTERNAL  -     Naproxen; Take 1 tablet (500 mg total) by mouth 2 (two) times daily with meals.  Dispense: 60 tablet; Refill: 0  -     Baclofen; Take 1 tablet (10 mg total) by mouth 3 (three) times daily as needed.  Dispense: 90 tablet; Refill: 0  -     methylPREDNISolone; Take as directed with food.  Dispense: 1 each; Refill: 0  -     CK (Creatine Kinase) (Not Creatinine); Future; Expected date: 04/11/2024  -     Electromyogram (EMG); Future; Expected date: 04/11/2024  -     XR KNEE (3 VIEWS), RIGHT (CPT=73562); Future; Expected date: 04/11/2024  -     Rheumatology Referral - In Network  -     Ortho Referral - [facility])  -     OP REFERRAL PAIN MANGEMENT  Pt with history of polyarthralgia (chronic back pain, and wrist/hand pain) distant past seen by pain management  Pt with ongoing lower back pain with radiculopathy  Plan:  -Start Medrol dose pack followed by NSAIDS (Naproxen 500mg po BID)  -Start baclofen 10mg po TID PRN for muscle spasm (start with half tablet, monitor for sedation then take second half)  -Obtain xrays of affected area (Lumbar/Sacroiliac,ect  -Obtain ESR, CRP, HLA-B27, RF and anti-ccp for evaluation of sacroiliitis/ankylosing spondylitis or other RF or MCTD.  -Refer to Physical therapy for evaluation and treatment (2-3x a week for the next 4-6 weeks)  -refer to Physiatry for further evaluation and rheumatology for if all above negative if fibromyalgia  -pending competition of above, might need MRI.   If ongoing pain will refer to pain management   2. Chronic bilateral low back pain with bilateral sciatica  -     Comp Metabolic Panel (14); Future; Expected date: 04/11/2024  -     C-Reactive Protein; Future; Expected date: 04/11/2024  -     Sed Mayco Navas (Automated); Future; Expected date:  04/11/2024  -     HLA B 27 Disease Association; Future; Expected date: 04/11/2024  -     PHYSIATRY - INTERNAL  -     XR SACROILIAC JOINTS (MIN 3 VIEWS) (CPT=72202); Future; Expected date: 04/11/2024  -     XR CERVICAL SPINE (4VIEWS) (CPT=72050); Future; Expected date: 04/11/2024  -     XR LUMBAR SPINE (MIN 4 VIEWS) (CPT=72110); Future; Expected date: 04/11/2024  -     PHYSICAL THERAPY - INTERNAL  -     Naproxen; Take 1 tablet (500 mg total) by mouth 2 (two) times daily with meals.  Dispense: 60 tablet; Refill: 0  -     Baclofen; Take 1 tablet (10 mg total) by mouth 3 (three) times daily as needed.  Dispense: 90 tablet; Refill: 0  -     methylPREDNISolone; Take as directed with food.  Dispense: 1 each; Refill: 0  -     CK (Creatine Kinase) (Not Creatinine); Future; Expected date: 04/11/2024  -     Electromyogram (EMG); Future; Expected date: 04/11/2024  -     XR KNEE (3 VIEWS), RIGHT (CPT=73562); Future; Expected date: 04/11/2024  -     Rheumatology Referral - In Network  -     Ortho Referral - [facility])  -     OP REFERRAL PAIN MANGEMENT  Plan  As above   3. Bilateral carpal tunnel syndrome  -     PHYSIATRY - INTERNAL  -     Electromyogram (EMG); Future; Expected date: 04/11/2024  -     Ortho Referral - [facility])  -     OP REFERRAL PAIN MANGEMENT  Plan  As above   4. Pain in both hands  -     PHYSIATRY - INTERNAL  -     Electromyogram (EMG); Future; Expected date: 04/11/2024  -     Ortho Referral - [facility])  -     OP REFERRAL PAIN MANGEMENt  Plan  As above   5. Cervical pain (neck)  -     Comp Metabolic Panel (14); Future; Expected date: 04/11/2024  -     Cancel: XR THORACIC SPINE (2 VIEWS) (CPT=72070); Future; Expected date: 04/11/2024  -     PHYSIATRY - INTERNAL  -     XR CERVICAL SPINE (4VIEWS) (CPT=72050); Future; Expected date: 04/11/2024  -     PHYSICAL THERAPY - INTERNAL  -     Naproxen; Take 1 tablet (500 mg total) by mouth 2 (two) times daily with  meals.  Dispense: 60 tablet; Refill: 0  -     Discontinue: Diclofenac Sodium; Take 1 tablet (75 mg total) by mouth 2 (two) times daily.  Dispense: 60 tablet; Refill: 0  -     Baclofen; Take 1 tablet (10 mg total) by mouth 3 (three) times daily as needed.  Dispense: 90 tablet; Refill: 0  -     methylPREDNISolone; Take as directed with food.  Dispense: 1 each; Refill: 0  -     CK (Creatine Kinase) (Not Creatinine); Future; Expected date: 04/11/2024  -     Electromyogram (EMG); Future; Expected date: 04/11/2024  -     Rheumatology Referral - In Network  -     Ortho Referral [facility])  -     OP REFERRAL PAIN MANGEMENT  Plan  As above   6. Lumbar back pain with radiculopathy affecting lower extremity  -     Comp Metabolic Panel (14); Future; Expected date: 04/11/2024  -     C-Reactive Protein; Future; Expected date: 04/11/2024  -     Sed Mayco Navas (Automated); Future; Expected date: 04/11/2024  -     HLA B 27 Disease Association; Future; Expected date: 04/11/2024  -     Cancel: XR THORACIC SPINE (2 VIEWS) (CPT=72070); Future; Expected date: 04/11/2024  -     PHYSIATRY - INTERNAL  -     XR LUMBAR SPINE (MIN 4 VIEWS) (CPT=72110); Future; Expected date: 04/11/2024  -     PHYSICAL THERAPY - INTERNAL  -     Naproxen; Take 1 tablet (500 mg total) by mouth 2 (two) times daily with meals.  Dispense: 60 tablet; Refill: 0  -     Discontinue: Diclofenac Sodium; Take 1 tablet (75 mg total) by mouth 2 (two) times daily.  Dispense: 60 tablet; Refill: 0  -     Baclofen; Take 1 tablet (10 mg total) by mouth 3 (three) times daily as needed.  Dispense: 90 tablet; Refill: 0  -     methylPREDNISolone; Take as directed with food.  Dispense: 1 each; Refill: 0  -     CK (Creatine Kinase) (Not Creatinine); Future; Expected date: 04/11/2024  -     Rheumatology Referral - In [facility]  -     [facility])  -     OP REFERRAL PAIN MANGEMENT  Plan  As above   7. Sacroiliac pain  -     Comp  Metabolic Panel (14); Future; Expected date: 04/11/2024  -     C-Reactive Protein; Future; Expected date: 04/11/2024  -     Sed Mayco Navas (Automated); Future; Expected date: 04/11/2024  -     HLA B 27 Disease Association; Future; Expected date: 04/11/2024  -     PHYSIATRY - INTERNAL  -     XR SACROILIAC JOINTS (MIN 3 VIEWS) (CPT=72202); Future; Expected date: 04/11/2024  -     PHYSICAL THERAPY - INTERNAL  -     Naproxen; Take 1 tablet (500 mg total) by mouth 2 (two) times daily with meals.  Dispense: 60 tablet; Refill: 0  -     Discontinue: Diclofenac Sodium; Take 1 tablet (75 mg total) by mouth 2 (two) times daily.  Dispense: 60 tablet; Refill: 0  -     Baclofen; Take 1 tablet (10 mg total) by mouth 3 (three) times daily as needed.  Dispense: 90 tablet; Refill: 0  -     methylPREDNISolone; Take as directed with food.  Dispense: 1 each; Refill: 0  -     CK (Creatine Kinase) (Not Creatinine); Future; Expected date: 04/11/2024  -     Rheumatology Referral - In Network  -     Ortho Referral - [facility])  -     OP REFERRAL PAIN MANGEMENT  Plan  As above   8. Chronic pain of right knee  -     XR KNEE (3 VIEWS), RIGHT (CPT=73562); Future; Expected date: 04/11/2024  -     Rheumatology Referral - In Network  -     Ortho Referral [facility])  -     OP REFERRAL PAIN MANGEMENT  Plan  As above   9. Postablative hypothyroidism  -     TSH W Reflex To Free T4; Future; Expected date: 04/11/2024  -     US THYROID W/PARA-THYRIOD (CPT=76536); Future; Expected date: 04/11/2024  Pt with history of Graves s/p radioactive ablation in 1990 on synthroid 137mcg daily  Plan;  -check TSH (goal is 0.4-4 range)  -dose adjust synthroid and send 6 month script pending reading above.  10. PTSD (post-traumatic stress disorder)  -     [facility] Referral - In Network  Pt with PTSD/KWESI and MDD, on trazadone alprazolam and atarax  Plan;  -pt with medicare and does not have Active  BH/psych  -refer to amberly haddad, also notified can see [address] [facility] for behavior health and pain management as well.-pt receptive to this.   11. Generalized anxiety disorder  -     [facility] Referral - In Network  Pt with PTSD/KWESI and MDD, on trazadone alprazolam and atarax  Plan;  -pt with medicare and does not have Active BH/psych  -refer to amberly haddad, also notified can see [address] [facility] for behavior health and pain management as well.-pt receptive to this.   12. Depressive disorder  -     [facility] Referral - In Network  Pt with PTSD/KWESI and MDD, on trazadone alprazolam and atarax  Plan;  -pt with medicare and does not have Active BH/psych  -refer to amberly haddad, also notified can see [address] [facility] for behavior health and pain management as well.-pt receptive to this.       Follow Up: As needed/if symptoms worsen or No follow-ups on file..     I spent 51 minutes obtaining pertitent medical history, reviewing pertinent imaging/labs and specialists notes, evaluating patient, discussing differential diagnosis' and various treatment options, reinforcing importance of compliance with treatment plan, and completing documentation.      Objective/ Results:   Physical Exam  Constitutional:       Appearance: She is well-developed.   Cardiovascular:      Rate and Rhythm: Normal rate and regular rhythm.      Heart sounds: Normal heart sounds.   Pulmonary:      Effort: Pulmonary effort is normal.      Breath sounds: Normal breath sounds.   Abdominal:      General: Bowel sounds are normal.      Palpations: Abdomen is soft.   Musculoskeletal:      Right shoulder: Tenderness present.      Left shoulder: Tenderness present.      Right wrist: Tenderness present. Normal pulse.      Left wrist: Tenderness present. Normal pulse.      Right hand: Tenderness present.      Left hand:  Tenderness present.      Cervical back: Rigidity, spasms and tenderness present. Decreased range of motion.      Thoracic back: Spasms and tenderness present. Decreased range of motion.      Lumbar back: Spasms and tenderness present. Decreased range of motion. Positive right straight leg raise test.      Right knee: Decreased range of motion. Tenderness present over the medial joint line.   Skin:     General: Skin is warm and dry.   Neurological:      Mental Status: She is alert and oriented to person, place, and time.      Deep Tendon Reflexes: Reflexes are normal and symmetric.        Reviewed:    Patient Active Problem List    Diagnosis    Graves disease    Pure hypercholesterolemia    Moderate persistent asthma without complication (HCC)    Cannabis abuse    Alcohol use disorder, moderate, dependence ([facility])    Acute bilateral low back pain without sciatica     Last Assessment & Plan: Formatting of this note might be different from the original. 10/10      Neck pain     Last Assessment & Plan: Formatting of this note might be different from the original. 10/10      Gastroesophageal reflux disease     Last Assessment & Plan: Formatting of this note might be different from the original. Will switch her to Omeprazole 40mg PO daily. We discussed lifestyle modifications for GERD The patient was counseled on nonpharmacologic management of GERD including avoiding food triggers, avoiding eating late and instructed to remain upright for 1-2 hrs postprandially and elevating the head of the bed. Additionally, patient was instructed to avoid all forms of caffeine including coffee, tea, soda and chocolate as well as to avoid citrus based foods including tomato based products. Recommend avoiding all NSAIDs if possible.      PTSD (post-traumatic stress disorder)    Generalized anxiety disorder    Anxiety    Depressive disorder      Allergies   Allergen Reactions    Quetiapine NAUSEA AND VOMITING     Heartburn    Morphine  ITCHING    Acetaminophen-Codeine ITCHING        Social History     Socioeconomic History    Marital status:    Tobacco Use    Smoking status: Every Day     Current packs/day: 0.03     Types: Cigarettes    Tobacco comments:     3 cigarettes a day, trying to cut down   Vaping Use    Vaping status: Never Used   Substance and Sexual Activity    Alcohol use: Never    Drug use: Never   Social History Narrative    ** Merged History Encounter **           Review of Systems   Constitutional: Negative.    Respiratory: Negative.     Cardiovascular: Negative.    Gastrointestinal: Negative.    Musculoskeletal:  Positive for arthralgias, back pain, joint swelling, myalgias, neck pain and neck stiffness. Negative for gait problem.   Skin: Negative.    Neurological: Negative.      All other systems negative unless otherwise stated in ROS or HPI above.       Stas Branch MD  Internal Medicine       Call office with any questions or seek emergency care if necessary.   Patient understands and agrees to follow directions and advice.      ----------------------------------------- PATIENT INSTRUCTIONS-----------------------------------------     There are no Patient Instructions on file for this visit.

## 2024-04-11 NOTE — TELEPHONE ENCOUNTER
Please review. Protocol Failed; No Protocol  Last office visit 4/11/2024  Medication is listed as patient reported.   Medication List  As of 4/11/2024 11:07 AM    Albuterol Sulfate 108 (90 Base) MCG/ACT 2 puffs Inhalation Every 6 hours PRN    ALPRAZolam 0.5 mg Oral Nightly, TAKE AT BEDTIME    Baclofen 10 mg Oral 3 times daily PRN    busPIRone HCl 10 mg Oral 3 times daily    Diclofenac Sodium 75 mg Oral 2 times daily    Dicyclomine HCl 10 mg Oral 4 times daily PRN    diphenhydrAMINE-Zinc Acetate 2-0.1 % 1 Application Topical 3 times daily PRN  Patient not taking: Reported on 4/11/2024    Fluticasone Propionate 50 MCG/ACT 2 sprays Nasal 2 times daily    hydrOXYzine HCl 25 mg Oral 3 times daily PRN    Levothyroxine Sodium 137 mcg Oral Daily    methylPREDNISolone      4 MG Tbpk, As directed.  Patient not taking: Reported on 4/11/2024    4 MG Tbpk, Take as directed with food.    Naproxen 500 mg Oral 2 times daily with meals    Omeprazole 40 mg Oral Daily    Rosuvastatin Calcium 5 mg Oral Nightly    traZODone HCl 300 MG    Venlafaxine HCl 300 mg Daily  Patient not taking: Reported on 4/11/2024       Requested Prescriptions   Pending Prescriptions Disp Refills    SYNTHROID 137 MCG Oral Tab 90 tablet 0     Sig: Take 137 mcg by mouth daily.       Thyroid Medication Protocol Failed - 4/10/2024 11:07 AM        Failed - In person appointment or virtual visit in the past 12 mos or appointment in next 3 mos     Recent Outpatient Visits              Today Chronic bilateral low back pain with bilateral sciatica    [facility] Stas Branch MD    Office Visit    6 months ago Adverse reaction to viral vaccines, [facility]    Office Visit    6 months ago Adverse reaction to viral vaccines, initial encounter    [facility]    Telemedicine    6  months ago Routine general medical examination at a health care facility    [facility]    Office Visit    7 months ago Seasonal allergic rhinitis, unspecified trigger    [facility]    [facility]          Future Appointments         Provider Department Appt Notes    Today ADO XR RM1 [facility] [place]     Today ADO XR RM1 [facility] [place]     In 1 week Nilesh Zeng PA [facility], [facility] NP Chronic bilateral low back pain with bilateral sciatica  Bilateral carpal tunnel syndrome     Ref by STAS STONER                    Passed - TSH in past 12 months        Passed - Last TSH value is normal     Lab Results   Component Value Date    TSH 2.620 09/23/2023                  Signed Prescriptions Disp Refills    rosuvastatin 5 MG Oral Tab 90 tablet 3     Sig: Take 1 tablet (5 mg total) by mouth nightly.       Cholesterol Medication Protocol Failed - 4/10/2024 11:07 AM        Failed - In person appointment or virtual visit in the past 12 mos or appointment in next 3 mos     Recent Outpatient Visits              Today Chronic bilateral low back pain with bilateral sciatica    [facility] Stas Stoner MD    Office Visit    6 months ago Adverse reaction to viral vaccines, initial encounter    [facility]    Office Visit    6 months ago Adverse reaction to viral vaccines, initial encounter    [facility]    Telemedicine    6 months ago Routine general medical examination at a health care facility    [facility]    Office Visit    7 months ago Seasonal allergic rhinitis, unspecified trigger     [facility]    [facility] Visit          Future Appointments         Provider Department Appt Notes    Today ADO XR RM1 [facility] [place]     Today ADO XR RM1 [facility] [place]     In 1 week Nilesh Zeng PA [facility] NP Chronic bilateral low back pain with bilateral sciatica  Bilateral carpal tunnel syndrome     Ref by JEB STONER                    Passed - ALT < 80     Lab Results   Component Value Date    ALT 40 09/23/2023             Passed - ALT resulted within past year        Passed - Lipid panel within past 12 months     Lab Results   Component Value Date    CHOLEST 268 (H) 09/23/2023    TRIG 117 09/23/2023    HDL 72 (H) 09/23/2023     (H) 09/23/2023    VLDL 24 09/23/2023    NONHDLC 196 (H) 09/23/2023                    Future Appointments         Provider Department Appt Notes    Today ADO XR RM1 [facility] [place]     Today ADO XR RM1 [facility] [place]     In 1 week Nilesh Zeng PA [facility] NP Chronic bilateral low back pain with bilateral sciatica  Bilateral carpal tunnel syndrome     Ref by JEB STONER          Recent Outpatient Visits              Today Chronic bilateral low back pain with bilateral sciatica    [facility] Jeb Stoner MD    Office Visit    6 months ago Adverse reaction to viral vaccines, initial encounter    [facility]    Office Visit    6 months ago Adverse reaction to viral vaccines, initial encounter    [facility]    Telemedicine    6 months ago Routine general medical examination at a health care facility    [facility]    Office Visit    7 months ago Seasonal allergic rhinitis, unspecified trigger    [facility] Celine Ni APRN    Office Visit

## 2024-04-12 RX ORDER — LEVOTHYROXINE SODIUM 137 MCG
137 TABLET ORAL DAILY
Qty: 90 TABLET | Refills: 3 | Status: SHIPPED | OUTPATIENT
Start: 2024-04-12

## 2024-04-12 NOTE — PROGRESS NOTES
Please relay to pt (multiple duplicate messages on her multiple different xrays):  Hello Iliana:  Xray of cervical spine: no significant abnormalities which could contribute to hand sensation from level of neck.  Xray of thoracic spine: mild degenerative disc disease no acute fracture  Xray of lumbar spine: Degenerative disc disease with disc bulge L5-S1 and mild bilateral neural foraminal narrowing L5-S1  Xray of sacroiliac: normal sacroiliac joint  Xray knee: small right knee efusion, no acute arthritis or fracture    Most of your pain in lower back is chronic, upper hands likely from carpal tunnel or autoimmune.   Please follow up with Rheumatologist, Orthopedic doctor and pain management for further evaluation

## 2024-04-18 ENCOUNTER — TELEPHONE (OUTPATIENT)
Age: 59
End: 2024-04-18

## 2024-04-19 ENCOUNTER — LAB ENCOUNTER (OUTPATIENT)
Dept: LAB | Age: 59
End: 2024-04-19
Attending: STUDENT IN AN ORGANIZED HEALTH CARE EDUCATION/TRAINING PROGRAM
Payer: MEDICARE

## 2024-04-19 ENCOUNTER — OFFICE VISIT (OUTPATIENT)
Dept: PAIN CLINIC | Facility: CLINIC | Age: 59
End: 2024-04-19
Payer: MEDICARE

## 2024-04-19 VITALS
DIASTOLIC BLOOD PRESSURE: 102 MMHG | BODY MASS INDEX: 21 KG/M2 | SYSTOLIC BLOOD PRESSURE: 140 MMHG | OXYGEN SATURATION: 96 % | HEART RATE: 74 BPM | WEIGHT: 136 LBS

## 2024-04-19 DIAGNOSIS — M54.41 CHRONIC BILATERAL LOW BACK PAIN WITH BILATERAL SCIATICA: ICD-10-CM

## 2024-04-19 DIAGNOSIS — M53.3 SACROILIAC PAIN: ICD-10-CM

## 2024-04-19 DIAGNOSIS — G89.29 CHRONIC BILATERAL LOW BACK PAIN WITH BILATERAL SCIATICA: ICD-10-CM

## 2024-04-19 DIAGNOSIS — M54.16 RIGHT LUMBAR RADICULOPATHY: ICD-10-CM

## 2024-04-19 DIAGNOSIS — M54.16 LUMBAR BACK PAIN WITH RADICULOPATHY AFFECTING LOWER EXTREMITY: ICD-10-CM

## 2024-04-19 DIAGNOSIS — M54.42 CHRONIC BILATERAL LOW BACK PAIN WITH BILATERAL SCIATICA: ICD-10-CM

## 2024-04-19 DIAGNOSIS — E89.0 POSTABLATIVE HYPOTHYROIDISM: ICD-10-CM

## 2024-04-19 DIAGNOSIS — M43.16 SPONDYLOLISTHESIS, LUMBAR REGION: Primary | ICD-10-CM

## 2024-04-19 DIAGNOSIS — M25.50 POLYARTHRALGIA: ICD-10-CM

## 2024-04-19 DIAGNOSIS — M54.2 CERVICAL PAIN (NECK): ICD-10-CM

## 2024-04-19 LAB
ALBUMIN SERPL-MCNC: 4.3 G/DL (ref 3.2–4.8)
ALBUMIN/GLOB SERPL: 1.7 {RATIO} (ref 1–2)
ALP LIVER SERPL-CCNC: 75 U/L
ALT SERPL-CCNC: 25 U/L
ANION GAP SERPL CALC-SCNC: 4 MMOL/L (ref 0–18)
AST SERPL-CCNC: 27 U/L (ref ?–34)
BILIRUB SERPL-MCNC: 0.5 MG/DL (ref 0.3–1.2)
BUN BLD-MCNC: 23 MG/DL (ref 9–23)
BUN/CREAT SERPL: 24.2 (ref 10–20)
CALCIUM BLD-MCNC: 9.1 MG/DL (ref 8.7–10.4)
CHLORIDE SERPL-SCNC: 110 MMOL/L (ref 98–112)
CK SERPL-CCNC: 89 U/L
CO2 SERPL-SCNC: 31 MMOL/L (ref 21–32)
CREAT BLD-MCNC: 0.95 MG/DL
CRP SERPL-MCNC: <0.4 MG/DL (ref ?–1)
EGFRCR SERPLBLD CKD-EPI 2021: 69 ML/MIN/1.73M2 (ref 60–?)
ERYTHROCYTE [SEDIMENTATION RATE] IN BLOOD: 2 MM/HR
FASTING STATUS PATIENT QL REPORTED: YES
GLOBULIN PLAS-MCNC: 2.6 G/DL (ref 2.8–4.4)
GLUCOSE BLD-MCNC: 82 MG/DL (ref 70–99)
OSMOLALITY SERPL CALC.SUM OF ELEC: 303 MOSM/KG (ref 275–295)
POTASSIUM SERPL-SCNC: 3.8 MMOL/L (ref 3.5–5.1)
PROT SERPL-MCNC: 6.9 G/DL (ref 5.7–8.2)
RHEUMATOID FACT SERPL-ACNC: <10 IU/ML (ref ?–14)
SODIUM SERPL-SCNC: 145 MMOL/L (ref 136–145)
T3FREE SERPL-MCNC: 1.85 PG/ML (ref 2.4–4.2)
T4 FREE SERPL-MCNC: 1.3 NG/DL (ref 0.8–1.7)
TSI SER-ACNC: 0.51 MIU/ML (ref 0.55–4.78)

## 2024-04-19 PROCEDURE — 81374 HLA I TYPING 1 ANTIGEN LR: CPT

## 2024-04-19 PROCEDURE — 80053 COMPREHEN METABOLIC PANEL: CPT

## 2024-04-19 PROCEDURE — 36415 COLL VENOUS BLD VENIPUNCTURE: CPT

## 2024-04-19 PROCEDURE — 84439 ASSAY OF FREE THYROXINE: CPT

## 2024-04-19 PROCEDURE — 84443 ASSAY THYROID STIM HORMONE: CPT

## 2024-04-19 PROCEDURE — 82550 ASSAY OF CK (CPK): CPT

## 2024-04-19 PROCEDURE — 85652 RBC SED RATE AUTOMATED: CPT

## 2024-04-19 PROCEDURE — 86200 CCP ANTIBODY: CPT

## 2024-04-19 PROCEDURE — 86431 RHEUMATOID FACTOR QUANT: CPT

## 2024-04-19 PROCEDURE — 86140 C-REACTIVE PROTEIN: CPT

## 2024-04-19 PROCEDURE — 84481 FREE ASSAY (FT-3): CPT

## 2024-04-19 RX ORDER — MIRTAZAPINE 7.5 MG/1
TABLET, FILM COATED ORAL
COMMUNITY
Start: 2024-04-18

## 2024-04-19 RX ORDER — TRAZODONE HYDROCHLORIDE 100 MG/1
TABLET ORAL
COMMUNITY
Start: 2024-04-18

## 2024-04-19 RX ORDER — DICLOFENAC SODIUM 75 MG/1
TABLET, DELAYED RELEASE ORAL
COMMUNITY
Start: 2024-04-11

## 2024-04-19 NOTE — PROGRESS NOTES
Patient: Iliana Dove  Medical Record Number: [id number]  Site: [facility]  Referring Physician:  Stas Branch  PCP: same    Dear Dr. Branch:    Thank you very much for requesting this consultation. I had the opportunity to evaluate and initiate care for your patient today, as per your request.    HISTORY OF CHIEF COMPLAINT:      Iliana Dove is a 58 year old female, who complains of diffuse pain.    Patient is here today with diffuse pain complaints involving entire spine, R LE, and B hands.  States that she has had this pain since her 20's, and has been to pain management, where she was placed on fentanyl, using 200 mcg changed every 3 days, with percocet 10/325 6/day.  In addition, states that she had undergone ESIs, to some initial relief.  She states that, eventually, she was taken off opiates, and in its place, has been using medical THC.  States that she has not been on opiates for years.      She states that, over the last \"few months\" has had flare of pain after moving.  She established with new PCP, and was sent for XR, and told to follow with pain management.  She has not been to PT recently, though is diligent with HEP, without improvement.  Taking anti-inflammatories, to partial relief.    VAS Pain Score:  6-10/10    Aggravating Factors: Relieving Factors:   Increased activity  Heating pad/ice packs  THC     Past Treatment Attempted/Patient’s Response:  As above     Past Medical History:    Cholecystitis    Colitis    Deviated septum    Diffuse abdominal pain    Last Assessment & Plan: Formatting of this note might be different from the original. Patient with chronic abdominal pain with negative workup in the past including EGD in 2015. Will prescribe Dicyclomine as suspect there may be some degree of IBS. Will also treat her GERD as below    Diverticulitis    H/O radioactive iodine thyroid ablation    Hernia    History of IBS    Lesion of mandible    UTI (urinary tract  infection)      Past Surgical History:   Procedure Laterality Date    Cholecystectomy      Hysterectomy      Tonsillectomy        Family History   Problem Relation Age of Onset    Other (cervical cancer) Mother     Other (HIV) Father     Other (addiction) Father     Arthritis Father     Breast Cancer Paternal Grandmother     Arthritis Paternal Grandfather     Cancer Son       Social History     Socioeconomic History    Marital status:    Tobacco Use    Smoking status: Every Day     Current packs/day: 0.03     Types: Cigarettes    Tobacco comments:     3 cigarettes a day, trying to cut down   Vaping Use    Vaping status: Never Used   Substance and Sexual Activity    Alcohol use: Never    Drug use: Never   Other Topics Concern    Caffeine Concern No    Exercise Yes      Current Medications:  Current Outpatient Medications   Medication Sig Dispense Refill    diclofenac 75 MG Oral Tab EC       mirtazapine 7.5 MG Oral Tab       traZODone 100 MG Oral Tab       SYNTHROID 137 MCG Oral Tab Take 137 mcg by mouth daily. 90 tablet 3    rosuvastatin 5 MG Oral Tab Take 1 tablet (5 mg total) by mouth nightly. 90 tablet 3    naproxen 500 MG Oral Tab Take 1 tablet (500 mg total) by mouth 2 (two) times daily with meals. 60 tablet 0    baclofen 10 MG Oral Tab Take 1 tablet (10 mg total) by mouth 3 (three) times daily as needed. 90 tablet 0    Omeprazole 40 MG Oral Capsule Delayed Release Take 1 capsule (40 mg total) by mouth daily. 90 capsule 3    hydrOXYzine 25 MG Oral Tab Take 1 tablet (25 mg total) by mouth 3 (three) times daily as needed for Itching. 21 tablet 0    diphenhydrAMINE-zinc (BENADRYL EXTRA STRENGTH) 2-0.1 % External Cream Apply 1 Application topically 3 (three) times daily as needed for Itching. 28 g 0    ALPRAZolam 0.5 MG Oral Tab Take 1 tablet (0.5 mg total) by mouth nightly. TAKE AT BEDTIME      busPIRone 10 MG Oral Tab Take 1 tablet (10 mg total) by mouth 3 (three) times daily.      dicyclomine 10 MG Oral  Cap Take 1 capsule (10 mg total) by mouth 4 (four) times daily as needed. 120 capsule 1    albuterol 108 (90 Base) MCG/ACT Inhalation Aero Soln Inhale 2 puffs into the lungs every 6 (six) hours as needed for Wheezing. 8 g 1    fluticasone propionate 50 MCG/ACT Nasal Suspension 2 sprays by Nasal route 2 (two) times daily. 15.8 mL 1    venlafaxine  MG Oral Capsule SR 24 Hr Take 2 capsules (300 mg total) by mouth daily.          Functional Assessment: Patient reports that they are able to complete all of their ADL's such as eating, bathing, using the toilet, dressing and getting up from a bed or a chair independently.    Work History:  The patient currently is on disability due to anxiety and depression.      REVIEW OF SYSTEMS:   10 point review of systems is otherwise negative,unless otherwise in HPI.      Radiology/Lab Test Reviewed:  XR L spine:    CONCLUSION: No acute osseous abnormality of the lumbar spine.  Mild disc disease and osteoarthritis in the mid-lower lumbar spine similar to prior exam.  Stable bilateral L5 pars defects with stable grade 1 anterolisthesis of L5 on S1.     CBC:    Lab Results   Component Value Date    WBC 5.1 09/23/2023    WBC 7.1 11/18/2016   No results found for: \"HEMOGLOBIN\"  Lab Results   Component Value Date    .0 09/23/2023     11/18/2016         PHYSICAL EXAMIMATION   PHYSICAL EXAMINATION: Iliana Dove is a 58 year old female who is observed sitting comfortably on a chair in the exam room alert and oriented times three. She looks consistent with her stated age.    Ht Readings from Last 1 Encounters:   04/11/24 68\"     Wt Readings from Last 1 Encounters:   04/19/24 136 lb (61.7 kg)     The patient is well developed, well nourished, normal body habitus, well muscled. She moves independently from sitting to standing with ease.       Inspection:  No acute distress    Patient displays Antalgic gait.    Coordination:  Well-coordinated, fluent gait, able to  engage in rapid alternating movements of upper and lower extremities.    ROM Lumbar Spine:  See chart below:  Motion Right (+ or -) Left (+ or -)   Lumbar Flexion + -   Lumbar Extension + -   Lumbar Bending + -   Lumbar Extension/ twisting motion - -     Lumbar/Sacral Integument:  Skin over lumbar sacral spine is intact without rashes, excoriations, lesions or erythema noted    Palpation:  See chart below:  Palpation of lumbar area Right (+ or -) Left (+ or -)   Lumbar facets - -   Lumbar paraspinals - -   Piriformis - -   SIJ - -   Trochanteric Bursa - -     Strength:  Strength deficits noted:  diminished str R EHL and dorsiflexion (4/5); 5/5 otherwise     Sensation:  No sensory deficits noted on bilateral lower extremities to light touch    Tests:  Test Right (+ or -) Left (+ or -)   SLR + -   Law’s     Babinski     Clonus       HEAD/NECK: Head is normocephalic, neck supple  EYES: EOMI, TIMBO  SKIN EXAM: Skin is intact, head, neck, trunk and arms/legs. No rashes, mottling or ulcerations.  LYMPH EXAM: There is no lymph edema in either lower extremity.  VASCULAR EXAM: Pedal pulses are normal bilaterally, with good distal perfusion. No clubbing or cyanosis.  ABDOMINAL EXAM: Abdomen is soft without masses palpated.  HEART: normal, regular, S1 and S2  LUNGS:CTA  MUSCULOSKELETAL: Smooth, pain-free ROM to bilat hips, ankles, and knees.     Do you have any known blood/bleeding disorders?  No  Does patient currently take blood thinners?   None  Does patient currently take any antibiotics?   No      Patient is currently on pain medications:  No  Reason pain medications are prescribed: N/A  Pain medications are prescribed by: N/A  [place] Prescription Monitoring review: N/A  DIRE: N/A  Treatment decision: N/A    MEDICAL DECISION MAKING:     Impression: L5 pars lysis, spondylolisthesis, right lumbar radiculopathy    Patient has diffuse pain complaints, chief amongst them is her low back and radicular right lower  extremity pain.  Symptoms have been present since her 20s, and had been followed by pain management.  States that she had been on significant doses of opiates, at one time taking 200 mcg of fentanyl patches, changed every 3 days, as well as 60 mg of oxycodone a day.  She states that she has long since weaned off opiates, and in its place, has been using medical THC for a number of years.  She did establish with new primary care physician, where x-rays revealed an L5 spondylolysis and resultant L5-S1 anterolisthesis.  On exam, does have pain with range of motion lumbar spine, with positive right straight leg raise and weakness of the right EHL and dorsiflexion.  Prior to consideration of injections versus surgical discussion, we will have her obtain an updated MRI, after which, we will review with her via virtual visit.    With regards to her opiates, I did suggest that she remain off of narcotics, which, given her difficulty coming off of them previously, she was more than happy to agree with.    Plan: Update MRI of the lumbar spine, follow-up to discuss options.    The patient indicates understanding of these issues and agrees to the plan.      Thank you very much.     Respectfully yours,    GALINA Morrison

## 2024-04-19 NOTE — PROGRESS NOTES
Subjective:   Patient ID: Iliana Dove is a 58 year old female.    HPI    History/Other:   Review of Systems  Current Outpatient Medications   Medication Sig Dispense Refill    SYNTHROID 137 MCG Oral Tab Take 137 mcg by mouth daily. 90 tablet 3    rosuvastatin 5 MG Oral Tab Take 1 tablet (5 mg total) by mouth nightly. 90 tablet 3    naproxen 500 MG Oral Tab Take 1 tablet (500 mg total) by mouth 2 (two) times daily with meals. 60 tablet 0    baclofen 10 MG Oral Tab Take 1 tablet (10 mg total) by mouth 3 (three) times daily as needed. 90 tablet 0    methylPREDNISolone 4 MG Oral Tablet Therapy Pack Take as directed with food. 1 each 0    Omeprazole 40 MG Oral Capsule Delayed Release Take 1 capsule (40 mg total) by mouth daily. 90 capsule 3    hydrOXYzine 25 MG Oral Tab Take 1 tablet (25 mg total) by mouth 3 (three) times daily as needed for Itching. 21 tablet 0    diphenhydrAMINE-zinc (BENADRYL EXTRA STRENGTH) 2-0.1 % External Cream Apply 1 Application topically 3 (three) times daily as needed for Itching. (Patient not taking: Reported on 4/11/2024) 28 g 0    methylPREDNISolone (MEDROL) 4 MG Oral Tablet Therapy Pack As directed. (Patient not taking: Reported on 4/11/2024) 1 each 0    ALPRAZolam 0.5 MG Oral Tab Take 1 tablet (0.5 mg total) by mouth nightly. TAKE AT BEDTIME      busPIRone 10 MG Oral Tab Take 1 tablet (10 mg total) by mouth 3 (three) times daily.      dicyclomine 10 MG Oral Cap Take 1 capsule (10 mg total) by mouth 4 (four) times daily as needed. 120 capsule 1    albuterol 108 (90 Base) MCG/ACT Inhalation Aero Soln Inhale 2 puffs into the lungs every 6 (six) hours as needed for Wheezing. 8 g 1    fluticasone propionate 50 MCG/ACT Nasal Suspension 2 sprays by Nasal route 2 (two) times daily. 15.8 mL 1    traZODone HCl 300 MG Oral Tab       venlafaxine  MG Oral Capsule SR 24 Hr Take 2 capsules (300 mg total) by mouth daily. (Patient not taking: Reported on 4/11/2024)        Allergies:  Allergies   Allergen Reactions    Quetiapine NAUSEA AND VOMITING     Heartburn    Morphine ITCHING    Acetaminophen-Codeine ITCHING       Objective:   Physical Exam    Assessment & Plan:   No diagnosis found.    No orders of the defined types were placed in this encounter.      Meds This Visit:  Requested Prescriptions      No prescriptions requested or ordered in this encounter       Imaging & Referrals:  None      Location of Pain: multiple sites    Date Pain Began: \"late 20's\"          Work Related:   No        Receiving Work Comp/Disability:   No    Numeric Rating Scale:  Pain at Present:  10                                                                                                            (No Pain) 0  to  10 (Worst Pain)                 Minimum Pain:   10  Maximum Pain  10    Distribution of Pain:    bilateral    Quality of Pain:   aching, burning, numbness, sharp/stabbing, throbbing, and tingling    Origin of Pain:    Lifting    Aggravating Factors:    Sitting and Standing    Past Treatments for Current Pain Condition:   Other narcotic medication    Prior diagnostic testing for your pain:  xrays

## 2024-04-19 NOTE — PATIENT INSTRUCTIONS
Refill policies:    Allow 2-3 business days for refills; controlled substances may take longer.  Contact your pharmacy at least 5 days prior to running out of medication and have them send an electronic request or submit request through the “request refill” option in your [employer] account.  Refills are not addressed on weekends; covering physicians do not authorize routine medications on weekends.  No narcotics or controlled substances are refilled after noon on Fridays or by on call physicians.  By law, narcotics must be electronically prescribed.  A 30 day supply with no refills is the maximum allowed.  If your prescription is due for a refill, you may be due for a follow up appointment.  To best provide you care, patients receiving routine medications need to be seen at least once a year.  Patients receiving narcotic/controlled substance medications need to be seen at least once every 3 months.  In the event that your preferred pharmacy does not have the requested medication in stock (e.g. Backordered), it is your responsibility to find another pharmacy that has the requested medication available.  We will gladly send a new prescription to that pharmacy at your request.    Scheduling Tests:    If your physician has ordered radiology tests such as MRI or CT scans, please contact Central Scheduling at [phone] right away to schedule the test.  Once scheduled, the [facility] Centralized Referral Team will work with your insurance carrier to obtain pre-certification or prior authorization.  Depending on your insurance carrier, approval may take 3-10 days.  It is highly recommended patients assure they have received an authorization before having a test performed.  If test is done without insurance authorization, patient may be responsible for the entire amount billed.      Precertification and Prior Authorizations:  If your physician has recommended that you have a procedure or additional testing performed the [facility]  Centralized Referral Team will contact your insurance carrier to obtain pre-certification or prior authorization.    You are strongly encouraged to contact your insurance carrier to verify that your procedure/test has been approved and is a COVERED benefit.  Although the [facility] Centralized Referral Team does its due diligence, the insurance carrier gives the disclaimer that \"Although the procedure is authorized, this does not guarantee payment.\"    Ultimately the patient is responsible for payment.   Thank you for your understanding in this matter.  Paperwork Completion:  If you require FMLA or disability paperwork for your recovery, please make sure to either drop it off or have it faxed to our office at [phone]. Be sure the form has your name and date of birth on it.  The form will be faxed to our Forms Department and they will complete it for you.  There is a 25$ fee for all forms that need to be filled out.  Please be aware there is a 10-14 day turnaround time.  You will need to sign a release of information (AWA) form if your paperwork does not come with one.  You may call the Forms Department with any questions at [phone].  Their fax number is [phone].

## 2024-04-24 LAB — CCP IGG SERPL-ACNC: 1.6 U/ML (ref 0–6.9)

## 2024-04-25 DIAGNOSIS — R79.89 ABNORMAL THYROID BLOOD TEST: Primary | ICD-10-CM

## 2024-04-26 ENCOUNTER — HOSPITAL ENCOUNTER (OUTPATIENT)
Dept: MRI IMAGING | Age: 59
Discharge: HOME | End: 2024-04-26
Attending: PHYSICIAN ASSISTANT
Payer: MEDICARE

## 2024-04-26 DIAGNOSIS — M43.16 SPONDYLOLISTHESIS, LUMBAR REGION: ICD-10-CM

## 2024-04-26 DIAGNOSIS — M54.16 RIGHT LUMBAR RADICULOPATHY: ICD-10-CM

## 2024-04-26 PROCEDURE — 72148 MRI LUMBAR SPINE W/O DYE: CPT | Performed by: PHYSICIAN ASSISTANT

## 2024-04-27 ENCOUNTER — HOSPITAL ENCOUNTER (OUTPATIENT)
Dept: ULTRASOUND IMAGING | Age: 59
Discharge: HOME | End: 2024-04-27
Attending: STUDENT IN AN ORGANIZED HEALTH CARE EDUCATION/TRAINING PROGRAM
Payer: MEDICARE

## 2024-04-27 DIAGNOSIS — E89.0 POSTABLATIVE HYPOTHYROIDISM: ICD-10-CM

## 2024-04-27 PROCEDURE — 76536 US EXAM OF HEAD AND NECK: CPT | Performed by: STUDENT IN AN ORGANIZED HEALTH CARE EDUCATION/TRAINING PROGRAM

## 2024-04-29 NOTE — PROGRESS NOTES
Please relay to patient:  Carrie Barrientos, your thyroid ultrasound shows bilateral shrunken thyroid s/p ablation. No nodules or microcalficiation which is reassuring.

## 2024-04-30 LAB — HLA-B27: NEGATIVE

## 2024-05-01 ENCOUNTER — TELEMEDICINE (OUTPATIENT)
Dept: PAIN CLINIC | Facility: CLINIC | Age: 59
End: 2024-05-01
Payer: MEDICARE

## 2024-05-01 DIAGNOSIS — M48.061 LUMBAR FORAMINAL STENOSIS: Primary | ICD-10-CM

## 2024-05-01 DIAGNOSIS — M54.16 RIGHT LUMBAR RADICULITIS: ICD-10-CM

## 2024-05-01 DIAGNOSIS — M43.16 SPONDYLOLISTHESIS OF LUMBAR REGION: ICD-10-CM

## 2024-05-01 PROCEDURE — 99213 OFFICE O/P EST LOW 20 MIN: CPT | Performed by: PHYSICIAN ASSISTANT

## 2024-05-01 NOTE — PROGRESS NOTES
Iliana Dove verbally consents to a Video Visit Check-In service on 05/01/24.    Duration of Service:  20 minutes      HPI:   Iliana Dove presents with complaints of Low back pain radiating to R LE .    The pain is described as severe aching, burning, stabbing, shooting that is constant .  The patient’s activity level has remained the same since last visit.  The pain is worst unrelated to time of day.    Changes in condition/history since last visit: Patient is here today via video visit for follow-up after updated MRI of the lumbar spine.  No change in symptoms.    Last procedure: N/A    date: N/A    Percentage of relief experienced from the procedure: N/A %    Duration of the relief: N/A    The following activities will increase the patient’s pain: walking, standing    The following activities decrease the patient’s pain: none    Functional Assessment: Patient reports that they are able to complete all of their ADL's such as eating, bathing, using the toilet, dressing and getting up from a bed or a chair independently.    Current Medications:  Current Outpatient Medications   Medication Sig Dispense Refill    diclofenac 75 MG Oral Tab EC       mirtazapine 7.5 MG Oral Tab       traZODone 100 MG Oral Tab       SYNTHROID 137 MCG Oral Tab Take 137 mcg by mouth daily. 90 tablet 3    rosuvastatin 5 MG Oral Tab Take 1 tablet (5 mg total) by mouth nightly. 90 tablet 3    naproxen 500 MG Oral Tab Take 1 tablet (500 mg total) by mouth 2 (two) times daily with meals. 60 tablet 0    baclofen 10 MG Oral Tab Take 1 tablet (10 mg total) by mouth 3 (three) times daily as needed. 90 tablet 0    Omeprazole 40 MG Oral Capsule Delayed Release Take 1 capsule (40 mg total) by mouth daily. 90 capsule 3    hydrOXYzine 25 MG Oral Tab Take 1 tablet (25 mg total) by mouth 3 (three) times daily as needed for Itching. 21 tablet 0    diphenhydrAMINE-zinc (BENADRYL EXTRA STRENGTH) 2-0.1 % External Cream Apply 1 Application  topically 3 (three) times daily as needed for Itching. 28 g 0    ALPRAZolam 0.5 MG Oral Tab Take 1 tablet (0.5 mg total) by mouth nightly. TAKE AT BEDTIME      busPIRone 10 MG Oral Tab Take 1 tablet (10 mg total) by mouth 3 (three) times daily.      dicyclomine 10 MG Oral Cap Take 1 capsule (10 mg total) by mouth 4 (four) times daily as needed. 120 capsule 1    albuterol 108 (90 Base) MCG/ACT Inhalation Aero Soln Inhale 2 puffs into the lungs every 6 (six) hours as needed for Wheezing. 8 g 1    fluticasone propionate 50 MCG/ACT Nasal Suspension 2 sprays by Nasal route 2 (two) times daily. 15.8 mL 1    venlafaxine  MG Oral Capsule SR 24 Hr Take 2 capsules (300 mg total) by mouth daily.        Patient requires assistance with: No assistance required    Reviewed Patient History Dated: 4/19/2024 no changes noted    Physical Exam:   There were no vitals taken for this visit.  VAS Pain Score:  10/10  General Appearance: Well developed, well nourished, normal build, independent body habitus, no apparent physical disabilities, well groomed    Neurological Exam: WNL-Orientation to time, place and person, normal mood & effect, normal concentration & attention span  Inspection: Nonantalgic, no acute distress  Radiology/Lab Test Reviewed: MRI L spine 4/26/24:  CONCLUSION:       1. Bilateral L5 pars defects with trace anterolisthesis of L5 on S1.      2. Multilevel degenerative changes lumbar spine as above without significant spinal canal stenosis at any level.      3. Mild to moderate right and mild left neural foraminal stenosis at L5-S1.      4. Mild facet arthropathy at L3-4 through L5-S1.     Lab Results   Component Value Date    WBC 5.1 09/23/2023    WBC 7.1 11/18/2016   No results found for: \"HEMOGLOBIN\"  Lab Results   Component Value Date    .0 09/23/2023     11/18/2016         Do you have any known blood/bleeding disorders?  No  Does patient currently take blood thinners?   None  Does patient  currently take any antibiotics?   No  Patient educated and verbalized understanding.  Medical Decision Making:   Diagnosis:    Encounter Diagnoses   Name Primary?    Lumbar foraminal stenosis Yes    Right lumbar radiculitis     Spondylolisthesis of lumbar region      Impression: Reviewed MRI with patient, which does reveal L5 pars lysis with a grade 1 L5-S1 anterolisthesis and mild if not moderate right foraminal stenosis at this level.  While this certainly would not explain the vast majority of her complaints, it could certainly explain her low back and radiating right lower extremity pain.  She has failed physical therapy, time, NSAIDs, HEP, and at one point, had been on high-dose narcotics, which have been largely ineffective (has been off opiates for years).  For diagnostic as well as potential therapeutic purposes, we will proceed with a right L5-S1 transforaminal epidural steroid injection, risks and benefits of which were reviewed in detail.  If this produces good but only brief improvement, may consider having her evaluated by surgery, though again, she understands that this would not be an attempt to help her diffuse pain complaints, but more specifically the radiating right lower extremity pain.    Plan: Patient to schedule the following injection: right TF-ADORE Levels: L5/S1, Procedure and risks were discussed with pt. including headache, bleeding, infection and potential nerve damage.  F/u 2-3 weeks.      No orders of the defined types were placed in this encounter.      Meds & Refills for this Visit:  Requested Prescriptions      No prescriptions requested or ordered in this encounter       Imaging & Consults:  None    The patient indicates understanding of these issues and agrees to the plan.    GALINA Morrison

## 2024-05-02 ENCOUNTER — TELEPHONE (OUTPATIENT)
Dept: PAIN CLINIC | Facility: CLINIC | Age: 59
End: 2024-05-02

## 2024-05-02 DIAGNOSIS — M54.16 LUMBAR RADICULITIS: Primary | ICD-10-CM

## 2024-05-02 NOTE — TELEPHONE ENCOUNTER
Prior authorization request completed for: right L5/S1 TLESI   Authorization # [id number]  Authorization dates: 05/02/24-7/5/24  CPT codes approved: 51465  Physician: charles  Location: [facility]     Patient can be scheduled. Routed to Navigator.

## 2024-05-03 NOTE — TELEPHONE ENCOUNTER
Returned patient's call to schedule.       Patient advised of insurance approval to proceed with injections and is agreeable to scheduling. Patient scheduled for procedure, pre-procedure instructions reviewed. Patient prefers Local sedation. Reviewed sedation instructions including No Fasting and No  Required. Patient encouraged but not required to hold Diclofenac and Naproxen for 24 hours prior to procedure. Patient verbalized understanding of instructions, no further needs at this time.      [facility]  PRE-PROCEDURE INSTRUCTIONS WITHOUT SEDATION    Procedure: Right L5/S1 TLESI       Appointment Date: 05/16/2024  Check-In Time: 08:45 AM    Video Visit Follow-Up Date/Time w/GALINA Lundberg : 05/30/2024 @ 09:30 AM    Prior to the procedure:  Please update us prior to the procedure if you are experiencing any symptoms of infection such as cough, fever, chills, urinary symptoms, or have recently been prescribed antibiotics, have open wounds, have recently had surgery or dental procedures.    Day of Procedure:  **Drivers will be required for patients who receive prescriptions for Valium.    NO FASTING REQUIRED  Please bring your Insurance Card, Photo ID, List of Current Medications and Referral (if applicable) to your appointment.  Please park in the [facility] and follow the signs to the [facility].  Check in at [facility] ([address] [place] [address]) outpatient registration in the [address] [facility].  Please note-No prescriptions will be written by Pain Clinic in [place] on the day of procedure. If you require a refill of medications, please contact the office 48 hours prior to your procedure.  If you have an implanted Spinal Cord or Peripheral Nerve Stimulator: Please remember to turn device off for procedure.        Medication Hold:    Number of days you need to be off for the following medications:    Aggrenox 10 days   Agrylin (Anagrelide) 10 days  Brilinta (Ticagrelor) 7  days  Imbruvica (Ibrutinib) 3 days   Enbrel (Etanercept) 24 hours   Fragmin (Dalteparin) 24 hours   Pletal (Cilostazol) 7 days  Effient (Prasugrel) 7 days  Pradaxa 10 days  Trental 7 days  Eliquis (Apixaban) 3 days  Xarelto (Rivaroxaban) 3 days  Lovenox (Enoxaparin) 24 hours  Aspirin  Greater than 81mg but less than 325mg   5 days  325mg and greater                  7 days  Coumadin       5 days  Procedure may be cancelled if INR is elevated.   Excedrin (with aspirin) 7 days  Plavix (Clopidogrel)                            7 days    NSAIDs: 24 hours preferred      Ibuprofen (Motrin, Advil, Vicoprofen), Naproxen (Naprosyn, Aleve), Piroxcam (Feldene), Meloxicam (Mobic), Oxaprozin (Daypro), Diclofenac (Voltaren), Indomethacin (Indocin), Etodolac (Lodine), Nabumetone (Relafen), Celebrex (Celecoxib)           HERBAL SUPPLEMENTS  5 days preferred  Fish oil, krill oil, Omega-3, Vascepa, Vitamin E, Turmeric, Garlic                       Insurance Authorization:   Most insurances are now requiring a preauthorization for all procedures.  In the event that your insurance does not authorize your procedure within 48 hours of the scheduled date, your procedure will be cancelled and rescheduled to a later date.  Please contact your insurance carrier to determine what your financial responsibility will be for the procedure(s).      Cancellation/Rescheduling Appointment:   In the event you need to cancel or reschedule your appointment, you must notify the office 24 hours prior.    Post-procedure instructions:        Please schedule a follow up visit within 2 to 4 weeks after your last procedure date   Please call our office with any questions or concerns before or after your procedure at  [phone].  If you are a diabetic, please increase the frequency of your glucose monitoring after the procedure as this may cause a temporary increase in your blood sugar.  Contact your primary care physician if your blood sugar rises as you may  require some medication adjustment.  It is normal to have increased pain at injection site for up to 3-5 days after procedure, you can use heat or ice (20 minutes on 20 minutes off) for comfort.    **To hear a recorded version of these instructions, please call [phone] and follow the prompts.  **Para escuchar las instrucciones en Español, por favor de llamar el sami [phone] opción 4.

## 2024-05-16 ENCOUNTER — HOSPITAL ENCOUNTER (OUTPATIENT)
Facility: HOSPITAL | Age: 59
Setting detail: HOSPITAL OUTPATIENT SURGERY
Discharge: HOME | End: 2024-05-16
Attending: ANESTHESIOLOGY | Admitting: ANESTHESIOLOGY

## 2024-05-16 ENCOUNTER — APPOINTMENT (OUTPATIENT)
Dept: GENERAL RADIOLOGY | Facility: HOSPITAL | Age: 59
End: 2024-05-16
Attending: ANESTHESIOLOGY

## 2024-05-16 VITALS
WEIGHT: 136 LBS | SYSTOLIC BLOOD PRESSURE: 157 MMHG | OXYGEN SATURATION: 100 % | HEART RATE: 49 BPM | HEIGHT: 68 IN | RESPIRATION RATE: 18 BRPM | DIASTOLIC BLOOD PRESSURE: 90 MMHG | BODY MASS INDEX: 20.61 KG/M2 | TEMPERATURE: 98 F

## 2024-05-16 PROCEDURE — 64483 NJX AA&/STRD TFRM EPI L/S 1: CPT | Performed by: ANESTHESIOLOGY

## 2024-05-16 PROCEDURE — 3E0R33Z INTRODUCTION OF ANTI-INFLAMMATORY INTO SPINAL CANAL, PERCUTANEOUS APPROACH: ICD-10-PCS | Performed by: ANESTHESIOLOGY

## 2024-05-16 RX ORDER — LIDOCAINE HYDROCHLORIDE 10 MG/ML
INJECTION, SOLUTION EPIDURAL; INFILTRATION; INTRACAUDAL; PERINEURAL
Status: DISCONTINUED | OUTPATIENT
Start: 2024-05-16 | End: 2024-05-16

## 2024-05-16 RX ORDER — SODIUM CHLORIDE, SODIUM LACTATE, POTASSIUM CHLORIDE, CALCIUM CHLORIDE 600; 310; 30; 20 MG/100ML; MG/100ML; MG/100ML; MG/100ML
100 INJECTION, SOLUTION INTRAVENOUS CONTINUOUS
Status: DISCONTINUED | OUTPATIENT
Start: 2024-05-16 | End: 2024-05-16

## 2024-05-16 RX ORDER — SODIUM CHLORIDE 9 MG/ML
INJECTION, SOLUTION INTRAMUSCULAR; INTRAVENOUS; SUBCUTANEOUS
Status: DISCONTINUED | OUTPATIENT
Start: 2024-05-16 | End: 2024-05-16

## 2024-05-16 RX ORDER — DEXAMETHASONE SODIUM PHOSPHATE 10 MG/ML
INJECTION, SOLUTION INTRAMUSCULAR; INTRAVENOUS
Status: DISCONTINUED | OUTPATIENT
Start: 2024-05-16 | End: 2024-05-16

## 2024-05-16 RX ORDER — ONDANSETRON 2 MG/ML
4 INJECTION INTRAMUSCULAR; INTRAVENOUS ONCE AS NEEDED
Status: DISCONTINUED | OUTPATIENT
Start: 2024-05-16 | End: 2024-05-16

## 2024-05-16 NOTE — DISCHARGE INSTRUCTIONS
Home Care Instructions Following Your Pain Procedure     Iliana,  It has been a pleasure to have you as our patient. To help you at home, you must follow these general discharge instructions. We will review these with you before you are discharged. It is our hope that you have a complete and uneventful recovery from our procedure.     General Instructions:  What to Expect:  Bandages from your procedure today can be removed when you get home.  Please avoid soaking and/or swimming for 24 hours.  Showering is okay  It is normal to have increased pain symptoms and/or pain at injection site for up to 3-5 days after procedure, you can use heat or ice (20 minutes on 20 minutes off) for comfort.  You may experience some temporary side effects which may include restlessness or insomnia, flushing of the face, or heart palpitations.  Please contact the provider if these symptoms do not resolve within 3-4 days.  Lightheadedness or nausea may occur and should resolve within 24 to 48 hours.  If you develop a headache after treatment, rest, drink fluids (with caffeine, if possible) and take mild over-the-counter pain medication.  If the headache does not improve with the above treatment, contact the physician.  Home Medications:  Resume all previously prescribed medication.  Please avoid taking NSAIDs (Non-Steriodal Anti-Inflammatory Drugs) such as:  Ibuprofen ( Advil, Motrin) Aleve (Naproxen), Diclofenac, Meloxicam for 6 hours after procedure.   If you are on Coumadin (Warfarin) or any other anti-coagulant (or \"blood thinning\") medication such as Plavix (Clopidogrel), Xarelto (Rivaroxaban), Eliquis (Apixaban), Effient (Prasugrel) etc., restart on the following day from the procedure unless otherwise directed by your provider.  If you are a diabetic, please increase the frequency of your glucose monitoring after the procedure as steroids may cause a temporary (2-3 day) increase in your blood sugar.  Contact your primary care  physician if your blood sugar remains elevated as you may require some medication adjustment.  Diet:  Resume your regular diet as tolerated.  Activity:  We recommend that you relax and rest during the rest of your procedure day.  If you feel weakness in your arms or legs do not drive.  Follow-up Appointment  Please schedule a follow-up visit within 3 to 4 weeks after your last procedure date.  Question or Concerns:  Feel free to call our office with any questions or concerns at [phone] (option #2)    Iliana  Thank you for coming to [facility] for your procedure.  The nurses try very hard to make sure you receive the best care possible.  Your trust in them as well as us is greatly appreciated.    Thanks so much,   Dr. Lawrence Hodgson

## 2024-05-16 NOTE — OPERATIVE REPORT
[facility]  Operative Report  2024     Iliana Dove Patient Status:  Hospital Outpatient Surgery    1965 MRN [id number]   Location [facility] PAIN CENTER Attending Lawrence Hodgson MD   Hosp Day # 0 PCP No primary care provider on file.     Indication: Iliana is a 59 year old female with lumbar radiculitis. MRI reviewed consistent with radiculopathy.    Preoperative Diagnosis:  Lumbar radiculitis [M54.16]    Postoperative Diagnosis: Same as above.    Procedure performed: right L5/S1 TRANSFORAMINAL LUMBAR EPIDURAL STEROID INJECTION SINGLE LEVEL with local    Anesthesia: Local and IV Sedation.    EBL: Less than 1 ml.    Procedure Description:   After reviewing the patient's history and performing a focused physical examination, the diagnosis was confirmed and contraindications such as infection and coagulopathy were ruled out.  Following review of potential side effects and complications, including but not necessarily limited to infection, allergic reaction, local tissue breakdown, nerve injury, and paresis, the patient indicated they understood and agreed to proceed. After obtaining the informed consent, the patient was brought to the procedure room and monitored.      In the prone position, following sterile prep and drape of the lumbar region, the L5 neural foramen was identified under fluoroscopy.  The skin and subcutaneous tissue was anesthetized via 25-gauge 1.5\" needle with approximately 2 cc of 1% lidocaine.  A 22-gauge 5\" Quincke spinal needle was introduced toward the inferior aspect of the junction between the transverse process and pedicle of the L5 level atraumatically under fluoroscopic guidance. The needle was advanced into the anterior epidural space at this level. The needle position was confirmed under AP and lateral fluoroscopic view.  Following negative aspiration for CSF and blood, approximately 1 cc of Omnipaque 240 was injected.  An excellent contrast  spread along the epidural space and the nerve root was obtained.  At this point, 2 cc of normal saline with 10 mg of dexamethasone was injected without complication.  The needle was withdrawn with stylet in situ. The patient tolerated procedure very well.  The patient was observed until discharge criteria met.  Discharge instructions were given and patient was released to a responsible adult.       Complications: None.    Follow up: The patient was followed in the pain clinic as needed basis.      Lawrence Hodgson MD

## 2024-05-16 NOTE — H&P
History & Physical Examination    Patient Name: Iliana Dove  MRN: [id number]  CSN: [id number]  YOB: 1965    Pre-Operative Diagnosis:  Lumbar radiculitis [M54.16]    Present Illness: Lumbar radiculitis    Medications Prior to Admission   Medication Sig Dispense Refill Last Dose    diclofenac 75 MG Oral Tab EC    5/15/2024    mirtazapine 7.5 MG Oral Tab    5/15/2024    SYNTHROID 137 MCG Oral Tab Take 137 mcg by mouth daily. 90 tablet 3 5/15/2024    rosuvastatin 5 MG Oral Tab Take 1 tablet (5 mg total) by mouth nightly. 90 tablet 3 5/15/2024    Omeprazole 40 MG Oral Capsule Delayed Release Take 1 capsule (40 mg total) by mouth daily. 90 capsule 3 5/15/2024    diphenhydrAMINE-zinc (BENADRYL EXTRA STRENGTH) 2-0.1 % External Cream Apply 1 Application topically 3 (three) times daily as needed for Itching. 28 g 0 Past Month    busPIRone 10 MG Oral Tab Take 1 tablet (10 mg total) by mouth 3 (three) times daily.   5/15/2024    dicyclomine 10 MG Oral Cap Take 1 capsule (10 mg total) by mouth 4 (four) times daily as needed. 120 capsule 1 Past Month    albuterol 108 (90 Base) MCG/ACT Inhalation Aero Soln Inhale 2 puffs into the lungs every 6 (six) hours as needed for Wheezing. 8 g 1 Past Week    fluticasone propionate 50 MCG/ACT Nasal Suspension 2 sprays by Nasal route 2 (two) times daily. 15.8 mL 1 5/15/2024    venlafaxine  MG Oral Capsule SR 24 Hr Take 2 capsules (300 mg total) by mouth daily.   5/15/2024    traZODone 100 MG Oral Tab        [] naproxen 500 MG Oral Tab Take 1 tablet (500 mg total) by mouth 2 (two) times daily with meals. 60 tablet 0     [] baclofen 10 MG Oral Tab Take 1 tablet (10 mg total) by mouth 3 (three) times daily as needed. 90 tablet 0     hydrOXYzine 25 MG Oral Tab Take 1 tablet (25 mg total) by mouth 3 (three) times daily as needed for Itching. 21 tablet 0 More than a month    ALPRAZolam 0.5 MG Oral Tab Take 1 tablet (0.5 mg total) by mouth nightly. TAKE AT  BEDTIME   More than a month     Current Facility-Administered Medications   Medication Dose Route Frequency    lactated ringers infusion  100 mL/hr Intravenous Continuous    ondansetron (Zofran) 4 MG/2ML injection 4 mg  4 mg Intravenous Once PRN       Allergies:   Allergies   Allergen Reactions    Quetiapine NAUSEA AND VOMITING     Heartburn    Morphine ITCHING    Acetaminophen-Codeine ITCHING       Past Medical History:    Cholecystitis    Colitis    Deviated septum    Diffuse abdominal pain    Last Assessment & Plan: Formatting of this note might be different from the original. Patient with chronic abdominal pain with negative workup in the past including EGD in 2015. Will prescribe Dicyclomine as suspect there may be some degree of IBS. Will also treat her GERD as below    Diverticulitis    H/O radioactive iodine thyroid ablation    Hernia    History of IBS    Lesion of mandible    UTI (urinary tract infection)     Past Surgical History:   Procedure Laterality Date    Cholecystectomy      Hysterectomy      Tonsillectomy       Family History   Problem Relation Age of Onset    Other (cervical cancer) Mother     Other (HIV) Father     Other (addiction) Father     Arthritis Father     Breast Cancer Paternal Grandmother     Arthritis Paternal Grandfather     Cancer Son      Social History     Tobacco Use    Smoking status: Every Day     Current packs/day: 0.03     Types: Cigarettes    Smokeless tobacco: Not on file    Tobacco comments:     3 cigarettes a day, trying to cut down   Substance Use Topics    Alcohol use: Never       SYSTEM Check if Review is Normal Check if Physical Exam is Normal If not normal, please explain:   HEENT [x ] [x ]    NECK & BACK [x ] [x ]    HEART [x ] [x ]    LUNGS [x ] [x ]    ABDOMEN [x ] [x ]    UROGENITAL [x ] [x ]    EXTREMITIES [x ] [x ]    OTHER        [ x ] I have discussed the risks and benefits and alternatives with the patient/family.  They understand and agree to proceed with  plan of care.  [ x ] I have reviewed the History and Physical done within the last 30 days.  Any changes noted above.    Lawrence Hodgson MD

## 2024-05-17 ENCOUNTER — TELEPHONE (OUTPATIENT)
Dept: PAIN CLINIC | Facility: CLINIC | Age: 59
End: 2024-05-17

## 2024-05-17 NOTE — TELEPHONE ENCOUNTER
Courtesy called placed to patient for post procedure follow up. Patient stated she's doing good.Pt verbalized understanding to call with any questions or concerns.      Procedure: right L5/S1 TRANSFORAMINAL LUMBAR EPIDURAL STEROID INJECTION   Date: 5/16/2024  Follow up Visit Scheduled: 5/30/2024 with Nilesh Zeng

## 2024-05-30 ENCOUNTER — TELEMEDICINE (OUTPATIENT)
Dept: PAIN CLINIC | Facility: CLINIC | Age: 59
End: 2024-05-30

## 2024-05-30 DIAGNOSIS — M43.16 SPONDYLOLISTHESIS OF LUMBAR REGION: Primary | ICD-10-CM

## 2024-05-30 DIAGNOSIS — M54.16 RIGHT LUMBAR RADICULITIS: ICD-10-CM

## 2024-05-30 PROCEDURE — 99213 OFFICE O/P EST LOW 20 MIN: CPT | Performed by: PHYSICIAN ASSISTANT

## 2024-05-30 NOTE — PROGRESS NOTES
Iliana Dove verbally consents to a Video Visit Check-In service on 05/30/24.    Duration of Service:  20 minutes      HPI:   Iliana Dove presents with complaints of Low back pain radiating to R LE .    The pain is described as severe aching, burning, stabbing, shooting that is constant .  The patient’s activity level has remained the same since last visit.  The pain is worst unrelated to time of day.    Changes in condition/history since last visit: Patient is here today for follow-up after right L5-S1 TF-ADORE on 5/16/2024.  Procedure was well-tolerated, and had no adverse effects.  Overall, states that this has not changed her pain in any level.    Last procedure: Right L5-S1 TF-ADORE    date: 5/16/2024    Percentage of relief experienced from the procedure: 0%    Duration of the relief: N/A    The following activities will increase the patient’s pain: walking, standing    The following activities decrease the patient’s pain: none    Functional Assessment: Patient reports that they are able to complete all of their ADL's such as eating, bathing, using the toilet, dressing and getting up from a bed or a chair independently.    Current Medications:  Current Outpatient Medications   Medication Sig Dispense Refill    diclofenac 75 MG Oral Tab EC       mirtazapine 7.5 MG Oral Tab       traZODone 100 MG Oral Tab       SYNTHROID 137 MCG Oral Tab Take 137 mcg by mouth daily. 90 tablet 3    rosuvastatin 5 MG Oral Tab Take 1 tablet (5 mg total) by mouth nightly. 90 tablet 3    Omeprazole 40 MG Oral Capsule Delayed Release Take 1 capsule (40 mg total) by mouth daily. 90 capsule 3    hydrOXYzine 25 MG Oral Tab Take 1 tablet (25 mg total) by mouth 3 (three) times daily as needed for Itching. 21 tablet 0    diphenhydrAMINE-zinc (BENADRYL EXTRA STRENGTH) 2-0.1 % External Cream Apply 1 Application topically 3 (three) times daily as needed for Itching. 28 g 0    ALPRAZolam 0.5 MG Oral Tab Take 1 tablet (0.5 mg total)  by mouth nightly. TAKE AT BEDTIME      busPIRone 10 MG Oral Tab Take 1 tablet (10 mg total) by mouth 3 (three) times daily.      dicyclomine 10 MG Oral Cap Take 1 capsule (10 mg total) by mouth 4 (four) times daily as needed. 120 capsule 1    albuterol 108 (90 Base) MCG/ACT Inhalation Aero Soln Inhale 2 puffs into the lungs every 6 (six) hours as needed for Wheezing. 8 g 1    fluticasone propionate 50 MCG/ACT Nasal Suspension 2 sprays by Nasal route 2 (two) times daily. 15.8 mL 1    venlafaxine  MG Oral Capsule SR 24 Hr Take 2 capsules (300 mg total) by mouth daily.        Patient requires assistance with: No assistance required    Reviewed Patient History Dated: 5/16/2024 no changes noted    Physical Exam:   There were no vitals taken for this visit.  VAS Pain Score:  10/10  General Appearance: Well developed, well nourished, normal build, independent body habitus, no apparent physical disabilities, well groomed    Neurological Exam: WNL-Orientation to time, place and person, normal mood & effect, normal concentration & attention span  Inspection: Nonantalgic, no acute distress  Radiology/Lab Test Reviewed: MRI L spine 4/26/24:  CONCLUSION:       1. Bilateral L5 pars defects with trace anterolisthesis of L5 on S1.      2. Multilevel degenerative changes lumbar spine as above without significant spinal canal stenosis at any level.      3. Mild to moderate right and mild left neural foraminal stenosis at L5-S1.      4. Mild facet arthropathy at L3-4 through L5-S1.     Lab Results   Component Value Date    WBC 5.1 09/23/2023    WBC 7.1 11/18/2016   No results found for: \"HEMOGLOBIN\"  Lab Results   Component Value Date    .0 09/23/2023     11/18/2016         Do you have any known blood/bleeding disorders?  No  Does patient currently take blood thinners?   None  Does patient currently take any antibiotics?   No  Patient educated and verbalized understanding.  Medical Decision Making:   Diagnosis:     Encounter Diagnoses   Name Primary?    Spondylolisthesis of lumbar region Yes    Right lumbar radiculitis        Impression: Ongoing and diffuse pain complaints which have been present since her 20s in the setting of MRI evidence of L5 pars lysis with a grade 1 L5-S1 anterolisthesis and mild if not moderate right foraminal stenosis at this level.  While this certainly would not explain the vast majority of her complaints, it could certainly explain her low back and radiating right lower extremity pain.  She has failed physical therapy, time, NSAIDs, HEP, LESI's at outside clinic, and at one point, had been on high-dose narcotics, which have been largely ineffective (has been off opiates for years).  For diagnostic as well as potential therapeutic purposes, did proceed with a right L5-S1 transforaminal epidural steroid injection, to limited, if any improvement.  Having failed all reasonable conservative management, she is inquiring about possible surgical consultation.  While I am happy to provide her with a surgical referral, she understands that this would not be an attempt to help her diffuse pain complaints, but more specifically the radiating right lower extremity pain.    Plan: Patient to follow up PRN.  Order in  for surgical discussion.       No orders of the defined types were placed in this encounter.      Meds & Refills for this Visit:  Requested Prescriptions      No prescriptions requested or ordered in this encounter       Imaging & Consults:  None    The patient indicates understanding of these issues and agrees to the plan.    GALINA Morrison

## 2024-06-20 ENCOUNTER — OFFICE VISIT (OUTPATIENT)
Dept: INTERNAL MEDICINE CLINIC | Facility: CLINIC | Age: 59
End: 2024-06-20

## 2024-06-20 VITALS
WEIGHT: 137 LBS | HEART RATE: 68 BPM | HEIGHT: 68 IN | BODY MASS INDEX: 20.76 KG/M2 | SYSTOLIC BLOOD PRESSURE: 139 MMHG | DIASTOLIC BLOOD PRESSURE: 83 MMHG

## 2024-06-20 DIAGNOSIS — J02.9 PHARYNGITIS, UNSPECIFIED ETIOLOGY: ICD-10-CM

## 2024-06-20 DIAGNOSIS — H65.92 LEFT NON-SUPPURATIVE OTITIS MEDIA: Primary | ICD-10-CM

## 2024-06-20 DIAGNOSIS — J45.40 MODERATE PERSISTENT ASTHMA WITHOUT COMPLICATION (HCC): ICD-10-CM

## 2024-06-20 LAB
CONTROL LINE PRESENT WITH A CLEAR BACKGROUND (YES/NO): YES YES/NO
KIT LOT #: NORMAL NUMERIC
STREP GRP A CUL-SCR: NEGATIVE

## 2024-06-20 PROCEDURE — 99214 OFFICE O/P EST MOD 30 MIN: CPT | Performed by: NURSE PRACTITIONER

## 2024-06-20 PROCEDURE — 87880 STREP A ASSAY W/OPTIC: CPT | Performed by: NURSE PRACTITIONER

## 2024-06-20 RX ORDER — AMOXICILLIN AND CLAVULANATE POTASSIUM 875; 125 MG/1; MG/1
1 TABLET, FILM COATED ORAL 2 TIMES DAILY
Qty: 20 TABLET | Refills: 0 | Status: SHIPPED | OUTPATIENT
Start: 2024-06-20 | End: 2024-06-30

## 2024-06-20 RX ORDER — ECHINACEA PURPUREA EXTRACT 125 MG
2 TABLET ORAL AS NEEDED
Qty: 60 ML | Refills: 0 | Status: SHIPPED | OUTPATIENT
Start: 2024-06-20

## 2024-06-20 RX ORDER — PREDNISONE 20 MG/1
20 TABLET ORAL 2 TIMES DAILY
Qty: 10 TABLET | Refills: 0 | Status: SHIPPED | OUTPATIENT
Start: 2024-06-20 | End: 2024-06-25

## 2024-06-20 RX ORDER — ALBUTEROL SULFATE 90 UG/1
2 AEROSOL, METERED RESPIRATORY (INHALATION) EVERY 6 HOURS PRN
Qty: 8 G | Refills: 1 | Status: SHIPPED | OUTPATIENT
Start: 2024-06-20

## 2024-06-20 RX ORDER — FLUTICASONE PROPIONATE 50 MCG
2 SPRAY, SUSPENSION (ML) NASAL 2 TIMES DAILY
Qty: 15.8 ML | Refills: 1 | Status: SHIPPED | OUTPATIENT
Start: 2024-06-20

## 2024-06-20 NOTE — PROGRESS NOTES
Iliana Dove is a 59 year old female.  HPI:   Pt has a hx of moderate persistent asthma, using only albuterol PRN and reports infrequent use until this recent illness, needs refill. She reports over past week cough that is productive, wheezing, sore throat. Reports left ear pain, no drainage. Denies any CP, SOB, HA, dizziness, vision changes, abdominal pain, rash, did not test for covid, fever or chills. She reports a few family members had strep and believes she may have it. Denies any white spots on throat or pain with swallowing.   Current Outpatient Medications   Medication Sig Dispense Refill    albuterol 108 (90 Base) MCG/ACT Inhalation Aero Soln Inhale 2 puffs into the lungs every 6 (six) hours as needed for Wheezing. 8 g 1    amoxicillin clavulanate 875-125 MG Oral Tab Take 1 tablet by mouth 2 (two) times daily for 10 days. 20 tablet 0    predniSONE 20 MG Oral Tab Take 1 tablet (20 mg total) by mouth 2 (two) times daily for 5 days. 10 tablet 0    fluticasone propionate 50 MCG/ACT Nasal Suspension 2 sprays by Nasal route 2 (two) times daily. 15.8 mL 1    sodium chloride 0.65 % Nasal Solution 2 sprays by Nasal route as needed. 60 mL 0    diclofenac 75 MG Oral Tab EC       mirtazapine 7.5 MG Oral Tab       traZODone 100 MG Oral Tab       SYNTHROID 137 MCG Oral Tab Take 137 mcg by mouth daily. 90 tablet 3    rosuvastatin 5 MG Oral Tab Take 1 tablet (5 mg total) by mouth nightly. 90 tablet 3    Omeprazole 40 MG Oral Capsule Delayed Release Take 1 capsule (40 mg total) by mouth daily. 90 capsule 3    hydrOXYzine 25 MG Oral Tab Take 1 tablet (25 mg total) by mouth 3 (three) times daily as needed for Itching. 21 tablet 0    diphenhydrAMINE-zinc (BENADRYL EXTRA STRENGTH) 2-0.1 % External Cream Apply 1 Application topically 3 (three) times daily as needed for Itching. 28 g 0    ALPRAZolam 0.5 MG Oral Tab Take 1 tablet (0.5 mg total) by mouth nightly. TAKE AT BEDTIME      busPIRone 10 MG Oral Tab Take 1 tablet  (10 mg total) by mouth 3 (three) times daily.      dicyclomine 10 MG Oral Cap Take 1 capsule (10 mg total) by mouth 4 (four) times daily as needed. 120 capsule 1    venlafaxine  MG Oral Capsule SR 24 Hr Take 2 capsules (300 mg total) by mouth daily.        Past Medical History:    Cholecystitis    Colitis    Deviated septum    Diffuse abdominal pain    Last Assessment & Plan: Formatting of this note might be different from the original. Patient with chronic abdominal pain with negative workup in the past including EGD in 2015. Will prescribe Dicyclomine as suspect there may be some degree of IBS. Will also treat her GERD as below    Diverticulitis    H/O radioactive iodine thyroid ablation    Hernia    History of IBS    Lesion of mandible    UTI (urinary tract infection)      Social History:  Social History     Socioeconomic History    Marital status:    Tobacco Use    Smoking status: Every Day     Current packs/day: 0.03     Types: Cigarettes    Tobacco comments:     3 cigarettes a day, trying to cut down   Vaping Use    Vaping status: Never Used   Substance and Sexual Activity    Alcohol use: Never    Drug use: Never   Other Topics Concern    Caffeine Concern No    Exercise Yes   Social History Narrative    ** Merged History Encounter **             REVIEW OF SYSTEMS:   Review of Systems   All other systems reviewed and are negative.         EXAM:   /83 (BP Location: Right arm, Patient Position: Sitting, Cuff Size: adult)   Pulse 68   Ht 5' 8\" (1.727 m)   Wt 137 lb (62.1 kg)   BMI 20.83 kg/m²     Physical Exam  Vitals reviewed.   Constitutional:       General: She is not in acute distress.     Appearance: Normal appearance. She is normal weight. She is not ill-appearing.   HENT:      Head: Normocephalic and atraumatic.      Right Ear: Tympanic membrane, ear canal and external ear normal.      Left Ear: Ear canal and external ear normal. Swelling present. A middle ear effusion is present.  Tympanic membrane is injected, erythematous and bulging. Tympanic membrane is not perforated.      Nose: Congestion present.      Mouth/Throat:      Pharynx: Oropharynx is clear. Uvula midline. No oropharyngeal exudate or posterior oropharyngeal erythema.      Tonsils: No tonsillar exudate.   Eyes:      General: No scleral icterus.        Right eye: No discharge.         Left eye: No discharge.      Extraocular Movements: Extraocular movements intact.      Conjunctiva/sclera: Conjunctivae normal.      Pupils: Pupils are equal, round, and reactive to light.   Neck:      Thyroid: No thyroid mass or thyromegaly.   Cardiovascular:      Rate and Rhythm: Normal rate and regular rhythm.      Pulses: Normal pulses.      Heart sounds: Normal heart sounds. No murmur heard.  Pulmonary:      Effort: Pulmonary effort is normal. No respiratory distress.      Breath sounds: Examination of the right-upper field reveals wheezing. Examination of the right-middle field reveals wheezing. Examination of the left-middle field reveals wheezing. Wheezing present.   Musculoskeletal:         General: Normal range of motion.      Cervical back: Normal range of motion and neck supple.      Right lower leg: No edema.      Left lower leg: No edema.   Lymphadenopathy:      Cervical: No cervical adenopathy.   Skin:     General: Skin is warm and dry.      Coloration: Skin is not jaundiced.   Neurological:      General: No focal deficit present.      Mental Status: She is alert and oriented to person, place, and time.      Motor: Motor function is intact.      Gait: Gait normal.   Psychiatric:         Mood and Affect: Mood normal.         Judgment: Judgment normal.            ASSESSMENT AND PLAN:   1. Moderate persistent asthma without complication (HCC)  -Start steroid burst, reviewed dose and s/e.  -Refilled albuterol and reviewed use.  -Reviewed alarm signs/when to go to ER.   - albuterol 108 (90 Base) MCG/ACT Inhalation Aero Soln; Inhale 2 puffs  into the lungs every 6 (six) hours as needed for Wheezing.  Dispense: 8 g; Refill: 1  - predniSONE 20 MG Oral Tab; Take 1 tablet (20 mg total) by mouth 2 (two) times daily for 5 days.  Dispense: 10 tablet; Refill: 0  - fluticasone propionate 50 MCG/ACT Nasal Suspension; 2 sprays by Nasal route 2 (two) times daily.  Dispense: 15.8 mL; Refill: 1  - sodium chloride 0.65 % Nasal Solution; 2 sprays by Nasal route as needed.  Dispense: 60 mL; Refill: 0    2. Left non-suppurative otitis media  -Start Augmentin. Reviewed ear care  Nasal saline 2 sprays in each nostril every 3-4 hours as needed.   Flonase 1-2 sprays in each nostril once daily.   Tylenol 500 mg every 6-8 hours as needed (max dose 3000 mg/day).   Hydrate, humidifier, rest, honey/elderberry   - amoxicillin clavulanate 875-125 MG Oral Tab; Take 1 tablet by mouth 2 (two) times daily for 10 days.  Dispense: 20 tablet; Refill: 0    3. Pharyngitis, unspecified etiology  -negative strep test, will send for culture.   -Nasal saline 2 sprays in each nostril every 3-4 hours as needed.   Flonase 1-2 sprays in each nostril once daily.   Tylenol 500 mg every 6-8 hours as needed (max dose 3000 mg/day).   Hydrate, humidifier, rest, honey/elderberry   - POC Rapid Strep [55930]  - Grp A Strep Cult, Throat [E]; Future  - Grp A Strep Cult, Throat [E]       The patient indicates understanding of these issues and agrees to the plan.  The patient is asked to return in 5-7 days/PRN.     The above note was creating using Dragon speech recognition technology. Please excuse any typos.

## 2024-06-21 ENCOUNTER — TELEPHONE (OUTPATIENT)
Dept: INTERNAL MEDICINE CLINIC | Facility: CLINIC | Age: 59
End: 2024-06-21

## 2024-06-21 NOTE — TELEPHONE ENCOUNTER
fluticasone propionate 50 MCG/ACT Nasal Suspension, 2 sprays by Nasal route 2 (two) times daily., Disp: 15.8 mL, Rfl: 1

## 2024-06-21 NOTE — TELEPHONE ENCOUNTER
Copied from yesterday's 6/20/24 Prescription:   \"fluticasone propionate 50 MCG/ACT Nasal Suspension      2 sprays by Nasal route 2 (two) times daily., Normal, Disp-15.8 mL, R-1   Dispense: 15.8 mL   Refills: 1 ordered   Pharmacy: [id number] - [place], [place] - [address] AT [facility] [address] [phone], [phone] (Ph: [phone])   Order Details  Ordered on: 06/20/24   Associated Dx: Moderate persistent asthma without complication (HCC)   Authorizing provider: Yasmeen Varela APRN\"       RN Called pharmacy. Patient's date of birth and full name both confirmed.   Spoke with davon  They have prescription and it does not need Prior Authorization, and no other issues.   RN will disregard this duplicate request.

## 2024-12-09 ENCOUNTER — OFFICE VISIT (OUTPATIENT)
Dept: INTERNAL MEDICINE CLINIC | Facility: CLINIC | Age: 59
End: 2024-12-09

## 2024-12-09 VITALS
RESPIRATION RATE: 18 BRPM | HEIGHT: 68 IN | OXYGEN SATURATION: 97 % | TEMPERATURE: 98 F | SYSTOLIC BLOOD PRESSURE: 134 MMHG | HEART RATE: 88 BPM | DIASTOLIC BLOOD PRESSURE: 82 MMHG | BODY MASS INDEX: 20.61 KG/M2 | WEIGHT: 136 LBS

## 2024-12-09 DIAGNOSIS — E78.00 PURE HYPERCHOLESTEROLEMIA: ICD-10-CM

## 2024-12-09 DIAGNOSIS — J45.40 MODERATE PERSISTENT ASTHMA WITHOUT COMPLICATION (HCC): ICD-10-CM

## 2024-12-09 DIAGNOSIS — F41.1 GENERALIZED ANXIETY DISORDER: ICD-10-CM

## 2024-12-09 DIAGNOSIS — J20.9 ACUTE BRONCHITIS, UNSPECIFIED ORGANISM: Primary | ICD-10-CM

## 2024-12-09 DIAGNOSIS — K21.9 GASTROESOPHAGEAL REFLUX DISEASE, UNSPECIFIED WHETHER ESOPHAGITIS PRESENT: ICD-10-CM

## 2024-12-09 DIAGNOSIS — K58.1 IRRITABLE BOWEL SYNDROME WITH CONSTIPATION: ICD-10-CM

## 2024-12-09 DIAGNOSIS — F32.A DEPRESSION, UNSPECIFIED DEPRESSION TYPE: ICD-10-CM

## 2024-12-09 DIAGNOSIS — Z00.00 ENCOUNTER FOR MEDICAL EXAMINATION TO ESTABLISH CARE: ICD-10-CM

## 2024-12-09 DIAGNOSIS — E05.00 GRAVES DISEASE: ICD-10-CM

## 2024-12-09 RX ORDER — FLUTICASONE PROPIONATE 50 MCG
2 SPRAY, SUSPENSION (ML) NASAL 2 TIMES DAILY
Qty: 15.8 ML | Refills: 1 | Status: SHIPPED | OUTPATIENT
Start: 2024-12-09

## 2024-12-09 RX ORDER — ROSUVASTATIN CALCIUM 5 MG/1
5 TABLET, COATED ORAL NIGHTLY
Qty: 90 TABLET | Refills: 3 | Status: SHIPPED | OUTPATIENT
Start: 2024-12-09

## 2024-12-09 RX ORDER — OMEPRAZOLE 40 MG/1
40 CAPSULE, DELAYED RELEASE ORAL DAILY
Qty: 90 CAPSULE | Refills: 3 | Status: SHIPPED | OUTPATIENT
Start: 2024-12-09

## 2024-12-09 RX ORDER — DICYCLOMINE HYDROCHLORIDE 10 MG/1
10 CAPSULE ORAL 4 TIMES DAILY PRN
Qty: 120 CAPSULE | Refills: 1 | Status: SHIPPED | OUTPATIENT
Start: 2024-12-09

## 2024-12-09 RX ORDER — PREDNISONE 20 MG/1
20 TABLET ORAL 2 TIMES DAILY
Qty: 6 TABLET | Refills: 0 | Status: SHIPPED | OUTPATIENT
Start: 2024-12-09 | End: 2024-12-12

## 2024-12-09 RX ORDER — ALBUTEROL SULFATE 90 UG/1
2 INHALANT RESPIRATORY (INHALATION) EVERY 6 HOURS PRN
Qty: 8 G | Refills: 1 | Status: SHIPPED | OUTPATIENT
Start: 2024-12-09

## 2024-12-09 NOTE — PROGRESS NOTES
Iliana Dove is a 59 year old female.  Chief Complaint   Patient presents with    Establish Care    Referral     HPI:    Patient presented today for establish care. She states that she has been having productive cough for last two weeks. Grand daughter was sick with similar symptoms and had rsv and covid. Patient denies any chest pain, shortness of breath, fever. Had some episodes of headaches. No other complains.     Needs refills on some of her medications    Needs a new referral to see the psychiatrist because changing doctors. Takes trazodone and mirtazipine.         Current Outpatient Medications   Medication Sig Dispense Refill    albuterol 108 (90 Base) MCG/ACT Inhalation Aero Soln Inhale 2 puffs into the lungs every 6 (six) hours as needed for Wheezing. 8 g 1    fluticasone propionate 50 MCG/ACT Nasal Suspension 2 sprays by Nasal route 2 (two) times daily. 15.8 mL 1    Omeprazole 40 MG Oral Capsule Delayed Release Take 1 capsule (40 mg total) by mouth daily. 90 capsule 3    rosuvastatin 5 MG Oral Tab Take 1 tablet (5 mg total) by mouth nightly. 90 tablet 3    dicyclomine 10 MG Oral Cap Take 1 capsule (10 mg total) by mouth 4 (four) times daily as needed. 120 capsule 1    sodium chloride 0.65 % Nasal Solution 2 sprays by Nasal route as needed. 60 mL 0    mirtazapine 7.5 MG Oral Tab       traZODone 100 MG Oral Tab       SYNTHROID 137 MCG Oral Tab Take 137 mcg by mouth daily. 90 tablet 3    diphenhydrAMINE-zinc (BENADRYL EXTRA STRENGTH) 2-0.1 % External Cream Apply 1 Application topically 3 (three) times daily as needed for Itching. 28 g 0      Past Medical History:    Cholecystitis    Colitis    Deviated septum    Diffuse abdominal pain    Last Assessment & Plan: Formatting of this note might be different from the original. Patient with chronic abdominal pain with negative workup in the past including EGD in 2015. Will prescribe Dicyclomine as suspect there may be some degree of IBS. Will also treat  her GERD as below    Diverticulitis    H/O radioactive iodine thyroid ablation    Hernia    History of IBS    Lesion of mandible    UTI (urinary tract infection)      Past Surgical History:   Procedure Laterality Date    Cholecystectomy      Hysterectomy      Tonsillectomy        Social History:  Social History     Socioeconomic History    Marital status:    Tobacco Use    Smoking status: Every Day     Current packs/day: 0.03     Types: Cigarettes    Tobacco comments:     3 cigarettes a day, trying to cut down   Vaping Use    Vaping status: Never Used   Substance and Sexual Activity    Alcohol use: Never    Drug use: Never   Other Topics Concern    Caffeine Concern No    Exercise Yes   Social History Narrative    ** Merged History Encounter **           Family History   Problem Relation Age of Onset    Other (cervical cancer) Mother     Other (HIV) Father     Other (addiction) Father     Arthritis Father     Breast Cancer Paternal Grandmother     Arthritis Paternal Grandfather     Cancer Son       Allergies[1]     REVIEW OF SYSTEMS:   Review of Systems   Review of Systems   Constitutional: Negative for activity change, appetite change and fever.   HENT: Negative for congestion and voice change.    Respiratory: Negative for cough and shortness of breath.    Cardiovascular: Negative for chest pain.   Gastrointestinal: Negative for abdominal distention, abdominal pain and vomiting.   Genitourinary: Negative for hematuria.   Skin: Negative for wound.   Psychiatric/Behavioral: Negative for behavioral problems.   Wt Readings from Last 5 Encounters:   12/09/24 136 lb (61.7 kg)   06/20/24 137 lb (62.1 kg)   05/15/24 136 lb (61.7 kg)   04/19/24 136 lb (61.7 kg)   04/11/24 136 lb (61.7 kg)     Body mass index is 20.68 kg/m².      EXAM:   BP (!) 143/94   Pulse 88   Temp 97.7 °F (36.5 °C) (Temporal)   Resp 18   Ht 5' 8\" (1.727 m)   Wt 136 lb (61.7 kg)   SpO2 97%   BMI 20.68 kg/m²   Physical Exam   Constitutional:        Appearance: Normal appearance.   HENT:      Head: Normocephalic.   Eyes:      Conjunctiva/sclera: Conjunctivae normal.   Cardiovascular:      Rate and Rhythm: Normal rate and regular rhythm.      Heart sounds: Normal heart sounds. No murmur heard.  Pulmonary:      Effort: Pulmonary effort is normal.      Breath sounds: Normal breath sounds. No rhonchi or rales.   Abdominal:      General: Bowel sounds are normal.      Palpations: Abdomen is soft.      Tenderness: There is no abdominal tenderness.   Musculoskeletal:      Cervical back: Neck supple.      Right lower leg: No edema.      Left lower leg: No edema.   Skin:     General: Skin is warm and dry.   Neurological:      General: No focal deficit present.      Mental Status: He is alert and oriented to person, place, and time. Mental status is at baseline.   Psychiatric:         Mood and Affect: Mood normal.         Behavior: Behavior normal.       ASSESSMENT AND PLAN:   1. Acute bronchitis, unspecified organism  2. Moderate persistent asthma without complication (HCC)  - albuterol as needed  - albuterol 108 (90 Base) MCG/ACT Inhalation Aero Soln; Inhale 2 puffs into the lungs every 6 (six) hours as needed for Wheezing.  Dispense: 8 g; Refill: 1  - fluticasone propionate 50 MCG/ACT Nasal Suspension; 2 sprays by Nasal route 2 (two) times daily.  Dispense: 15.8 mL; Refill: 1    3. Gastroesophageal reflux disease, unspecified whether esophagitis present  - Omeprazole 40 MG Oral Capsule Delayed Release; Take 1 capsule (40 mg total) by mouth daily.  Dispense: 90 capsule; Refill: 3    4. Pure hypercholesterolemia  - rosuvastatin 5 MG Oral Tab; Take 1 tablet (5 mg total) by mouth nightly.  Dispense: 90 tablet; Refill: 3  - Lipid Panel; Future    5. Depression, unspecified depression type  6. Generalized anxiety disorder  - Psychiatry Referral - In Network    7. Irritable bowel syndrome with constipation  - dicyclomine 10 MG Oral Cap; Take 1 capsule (10 mg total) by  mouth 4 (four) times daily as needed.  Dispense: 120 capsule; Refill: 1  - CBC With Differential With Platelet; Future    8. Graves disease  - on synthroid  - TSH W Reflex To Free T4; Future    9. Encounter for medical examination to establish care  - Comp Metabolic Panel (14); Future      The patient indicates understanding of these issues and agrees to the plan.  Return in January for annual physical     Emilie Matias MD        [1]   Allergies  Allergen Reactions    Quetiapine NAUSEA AND VOMITING     Heartburn    Morphine ITCHING    Acetaminophen-Codeine ITCHING

## 2024-12-18 ENCOUNTER — TELEPHONE (OUTPATIENT)
Dept: CASE MANAGEMENT | Age: 59
End: 2024-12-18

## 2024-12-18 RX ORDER — MIRTAZAPINE 7.5 MG/1
15 TABLET, FILM COATED ORAL NIGHTLY
Qty: 90 TABLET | Refills: 0 | Status: SHIPPED | OUTPATIENT
Start: 2024-12-18

## 2024-12-18 RX ORDER — MIRTAZAPINE 7.5 MG/1
TABLET, FILM COATED ORAL
Refills: 0 | Status: CANCELLED | OUTPATIENT
Start: 2024-12-18

## 2024-12-18 RX ORDER — TRAZODONE HYDROCHLORIDE 100 MG/1
100 TABLET ORAL NIGHTLY
Qty: 90 TABLET | Refills: 0 | Status: SHIPPED | OUTPATIENT
Start: 2024-12-18

## 2024-12-18 RX ORDER — TRAZODONE HYDROCHLORIDE 100 MG/1
TABLET ORAL
Refills: 0 | Status: CANCELLED | OUTPATIENT
Start: 2024-12-18

## 2024-12-18 NOTE — TELEPHONE ENCOUNTER
Dr. Matias,     Patient requesting PCP to refill behavioral health meds.  Previous behavioral health provider will not refill RX due to office billing.     She is out of medication.     Trazodone 100 mg 1 at bedtime    Mirtazapine 15 mg 1 at bedtime    Please call patient if you have any questions.     Thank you

## 2025-01-09 ENCOUNTER — LAB ENCOUNTER (OUTPATIENT)
Dept: LAB | Age: 60
End: 2025-01-09
Attending: INTERNAL MEDICINE
Payer: MEDICARE

## 2025-01-09 DIAGNOSIS — K58.1 IRRITABLE BOWEL SYNDROME WITH CONSTIPATION: ICD-10-CM

## 2025-01-09 DIAGNOSIS — E05.00 GRAVES DISEASE: ICD-10-CM

## 2025-01-09 DIAGNOSIS — Z00.00 ENCOUNTER FOR MEDICAL EXAMINATION TO ESTABLISH CARE: ICD-10-CM

## 2025-01-09 DIAGNOSIS — E78.00 PURE HYPERCHOLESTEROLEMIA: ICD-10-CM

## 2025-01-09 LAB
ALBUMIN SERPL-MCNC: 4.7 G/DL (ref 3.2–4.8)
ALBUMIN/GLOB SERPL: 2 {RATIO} (ref 1–2)
ALP LIVER SERPL-CCNC: 91 U/L
ALT SERPL-CCNC: 22 U/L
ANION GAP SERPL CALC-SCNC: 8 MMOL/L (ref 0–18)
AST SERPL-CCNC: 27 U/L (ref ?–34)
BASOPHILS # BLD AUTO: 0.06 X10(3) UL (ref 0–0.2)
BASOPHILS NFR BLD AUTO: 1 %
BILIRUB SERPL-MCNC: 0.3 MG/DL (ref 0.2–1.1)
BUN BLD-MCNC: 16 MG/DL (ref 9–23)
BUN/CREAT SERPL: 16.3 (ref 10–20)
CALCIUM BLD-MCNC: 9.6 MG/DL (ref 8.7–10.4)
CHLORIDE SERPL-SCNC: 107 MMOL/L (ref 98–112)
CHOLEST SERPL-MCNC: 213 MG/DL (ref ?–200)
CO2 SERPL-SCNC: 26 MMOL/L (ref 21–32)
CREAT BLD-MCNC: 0.98 MG/DL
DEPRECATED RDW RBC AUTO: 47.5 FL (ref 35.1–46.3)
EGFRCR SERPLBLD CKD-EPI 2021: 66 ML/MIN/1.73M2 (ref 60–?)
EOSINOPHIL # BLD AUTO: 0.25 X10(3) UL (ref 0–0.7)
EOSINOPHIL NFR BLD AUTO: 4 %
ERYTHROCYTE [DISTWIDTH] IN BLOOD BY AUTOMATED COUNT: 13.4 % (ref 11–15)
FASTING PATIENT LIPID ANSWER: YES
FASTING STATUS PATIENT QL REPORTED: YES
GLOBULIN PLAS-MCNC: 2.4 G/DL (ref 2–3.5)
GLUCOSE BLD-MCNC: 89 MG/DL (ref 70–99)
HCT VFR BLD AUTO: 42.2 %
HDLC SERPL-MCNC: 67 MG/DL (ref 40–59)
HGB BLD-MCNC: 14.3 G/DL
IMM GRANULOCYTES # BLD AUTO: 0.01 X10(3) UL (ref 0–1)
IMM GRANULOCYTES NFR BLD: 0.2 %
LDLC SERPL CALC-MCNC: 124 MG/DL (ref ?–100)
LYMPHOCYTES # BLD AUTO: 3.69 X10(3) UL (ref 1–4)
LYMPHOCYTES NFR BLD AUTO: 58.9 %
MCH RBC QN AUTO: 32.3 PG (ref 26–34)
MCHC RBC AUTO-ENTMCNC: 33.9 G/DL (ref 31–37)
MCV RBC AUTO: 95.3 FL
MONOCYTES # BLD AUTO: 0.54 X10(3) UL (ref 0.1–1)
MONOCYTES NFR BLD AUTO: 8.6 %
NEUTROPHILS # BLD AUTO: 1.71 X10 (3) UL (ref 1.5–7.7)
NEUTROPHILS # BLD AUTO: 1.71 X10(3) UL (ref 1.5–7.7)
NEUTROPHILS NFR BLD AUTO: 27.3 %
NONHDLC SERPL-MCNC: 146 MG/DL (ref ?–130)
OSMOLALITY SERPL CALC.SUM OF ELEC: 293 MOSM/KG (ref 275–295)
PLATELET # BLD AUTO: 191 10(3)UL (ref 150–450)
POTASSIUM SERPL-SCNC: 3.9 MMOL/L (ref 3.5–5.1)
PROT SERPL-MCNC: 7.1 G/DL (ref 5.7–8.2)
RBC # BLD AUTO: 4.43 X10(6)UL
SODIUM SERPL-SCNC: 141 MMOL/L (ref 136–145)
TRIGL SERPL-MCNC: 123 MG/DL (ref 30–149)
TSI SER-ACNC: 0.91 UIU/ML (ref 0.55–4.78)
VLDLC SERPL CALC-MCNC: 22 MG/DL (ref 0–30)
WBC # BLD AUTO: 6.3 X10(3) UL (ref 4–11)

## 2025-01-09 PROCEDURE — 36415 COLL VENOUS BLD VENIPUNCTURE: CPT

## 2025-01-09 PROCEDURE — 80053 COMPREHEN METABOLIC PANEL: CPT

## 2025-01-09 PROCEDURE — 85025 COMPLETE CBC W/AUTO DIFF WBC: CPT

## 2025-01-09 PROCEDURE — 80061 LIPID PANEL: CPT

## 2025-01-09 PROCEDURE — 84443 ASSAY THYROID STIM HORMONE: CPT

## 2025-01-13 ENCOUNTER — OFFICE VISIT (OUTPATIENT)
Dept: INTERNAL MEDICINE CLINIC | Facility: CLINIC | Age: 60
End: 2025-01-13

## 2025-01-13 VITALS
BODY MASS INDEX: 21.67 KG/M2 | OXYGEN SATURATION: 98 % | HEIGHT: 68 IN | DIASTOLIC BLOOD PRESSURE: 86 MMHG | SYSTOLIC BLOOD PRESSURE: 138 MMHG | HEART RATE: 73 BPM | WEIGHT: 143 LBS

## 2025-01-13 DIAGNOSIS — E89.0 POSTABLATIVE HYPOTHYROIDISM: ICD-10-CM

## 2025-01-13 DIAGNOSIS — E78.00 PURE HYPERCHOLESTEROLEMIA: ICD-10-CM

## 2025-01-13 DIAGNOSIS — Z71.6 ENCOUNTER FOR TOBACCO USE CESSATION COUNSELING: ICD-10-CM

## 2025-01-13 DIAGNOSIS — Z00.00 ANNUAL PHYSICAL EXAM: Primary | ICD-10-CM

## 2025-01-13 DIAGNOSIS — Z12.31 ENCOUNTER FOR SCREENING MAMMOGRAM FOR MALIGNANT NEOPLASM OF BREAST: ICD-10-CM

## 2025-01-13 DIAGNOSIS — F41.1 GENERALIZED ANXIETY DISORDER: ICD-10-CM

## 2025-01-13 DIAGNOSIS — F17.200 SMOKER: ICD-10-CM

## 2025-01-13 NOTE — PROGRESS NOTES
Subjective:   Iliana Dove is a 59 year old female who presents for a Initial Annual Wellness Visit (outside the first 12 months of Medicare eligibility, no prior AWV) and scheduled follow up of multiple significant but stable problems.    Patient seen and examined.   States that she has been doing stable over all.    Has h/o IBS, failed linzess. Her last colonsocpy was in 8/2022 and she was told to come back in 10 years. She states that she stopped linzess because of diarrhea. Takes imodium as needed.    Had two episodes of hand cramping in left hand, also experienced raynaud's phenomenon.    C/o gum pain, has been experiencing involuntary sucking on her gums due to which she has lost a tooth. States she has been evaluated by multiple dentist and no change in her symptoms.     History/Other:   Fall Risk Assessment:   She has been screened for Falls and is High Risk. Fall Prevention information provided to patient in After Visit Summary.    Do you feel unsteady when standing or walking?: Yes (sometimes)  Do you worry about falling?: No  Have you fallen in the past year?: No     Cognitive Assessment:   She had a completely normal cognitive assessment - see flowsheet entries     Functional Ability/Status:   Iliana Dove has a completely normal functional assessment. See flowsheet for details.      Depression Screening (PHQ):  PHQ-2 SCORE: 1  , done 1/13/2025   Feeling down, depressed, or hopeless: 1         5 minutes spent screening and counseling for depression    Advanced Directives:   She does NOT have a Living Will. [Do you have a living will?: No]  She does NOT have a Power of  for Health Care. [Do you have a healthcare power of ?: No]  Discussed Advance Care Planning with patient (and family/surrogate if present). Standard forms made available to patient in After Visit Summary.      Patient Active Problem List   Diagnosis    Alcohol use disorder, moderate, dependence (HCC)     Acute bilateral low back pain without sciatica    Anxiety    Depressive disorder    Cannabis abuse    Gastroesophageal reflux disease    PTSD (post-traumatic stress disorder)    Neck pain    Generalized anxiety disorder    Moderate persistent asthma without complication (HCC)    Pure hypercholesterolemia    Graves disease     Allergies:  She is allergic to quetiapine, morphine, and acetaminophen-codeine.    Current Medications:  Outpatient Medications Marked as Taking for the 1/13/25 encounter (Office Visit) with Emilie Matias MD   Medication Sig    mirtazapine 7.5 MG Oral Tab Take 2 tablets (15 mg total) by mouth nightly.    traZODone 100 MG Oral Tab Take 1 tablet (100 mg total) by mouth nightly.    albuterol 108 (90 Base) MCG/ACT Inhalation Aero Soln Inhale 2 puffs into the lungs every 6 (six) hours as needed for Wheezing.    fluticasone propionate 50 MCG/ACT Nasal Suspension 2 sprays by Nasal route 2 (two) times daily.    Omeprazole 40 MG Oral Capsule Delayed Release Take 1 capsule (40 mg total) by mouth daily.    rosuvastatin 5 MG Oral Tab Take 1 tablet (5 mg total) by mouth nightly.    dicyclomine 10 MG Oral Cap Take 1 capsule (10 mg total) by mouth 4 (four) times daily as needed.    sodium chloride 0.65 % Nasal Solution 2 sprays by Nasal route as needed.    SYNTHROID 137 MCG Oral Tab Take 137 mcg by mouth daily.    diphenhydrAMINE-zinc (BENADRYL EXTRA STRENGTH) 2-0.1 % External Cream Apply 1 Application topically 3 (three) times daily as needed for Itching.       Medical History:  She  has a past medical history of Cholecystitis, Colitis, Deviated septum (11/24/2020), Diffuse abdominal pain (06/20/2018), Diverticulitis, H/O radioactive iodine thyroid ablation (1990), Hernia, History of IBS, Lesion of mandible (07/01/2019), and UTI (urinary tract infection) (06/20/2018).  Surgical History:  She  has a past surgical history that includes cholecystectomy; hysterectomy; and tonsillectomy.   Family  History:  Her family history includes Arthritis in her father and paternal grandfather; Breast Cancer in her paternal grandmother; Cancer in her son; HIV in her father; addiction in her father; cervical cancer in her mother.  Social History:  She  reports that she has been smoking cigarettes. She has never used smokeless tobacco. She reports that she does not drink alcohol and does not use drugs.    Tobacco:   Has been trying to quit. Smokes 3 ciggrattes per day.    Used to smoke one case per month but now to 4 packs per month.     Please update the information in the Tobacco history section to reflect the true status, and refresh this smartlink.  Social History     Tobacco Use   Smoking Status Every Day    Current packs/day: 0.03    Types: Cigarettes   Smokeless Tobacco None   Tobacco Comments    3 cigarettes a day, trying to cut down     E-Cigarettes/Vaping       Questions Responses    E-Cigarette Use Never User          E-Cigarette/Vaping Substances       Questions Responses    Nicotine No    THC No    CBD No    Flavoring No          E-Cigarette/Vaping Devices       Questions Responses    Disposable No    Pre-filled or Refillable Cartridge No    Refillable Tank No    Pre-filled Pod No           Tobacco cessation counseling for 3-10 minutes (add E/M code #46909).      CAGE Alcohol Screen:   CAGE screening score of 0 on 1/13/2025, showing low risk of alcohol abuse.      Patient Care Team:  Emilie Matias MD as PCP - General (Internal Medicine)    Review of Systems  GENERAL: feels well otherwise  SKIN: denies any unusual skin lesions  EYES: denies blurred vision or double vision  HEENT: denies nasal congestion, sinus pain or ST  LUNGS: denies shortness of breath with exertion  CARDIOVASCULAR: denies chest pain on exertion  GI: denies abdominal pain, denies heartburn  : denies dysuria, vaginal discharge or itching, no complaint of urinary incontinence   MUSCULOSKELETAL: denies back pain  NEURO: denies  headaches  PSYCHE: denies depression or anxiety  HEMATOLOGIC: denies hx of anemia  ENDOCRINE: denies thyroid history  ALL/ASTHMA: denies hx of allergy or asthma    Objective:   Physical Exam  General Appearance:  Alert, cooperative, no distress, appears stated age   Head:  Normocephalic, without obvious abnormality, atraumatic   Eyes:  PERRL, conjunctiva/corneas clear, EOM's intact both eyes   Ears:  Normal TM's and external ear canals, both ears   Nose: Nares normal, septum midline,mucosa normal, no drainage or sinus tenderness   Throat: Lips, mucosa, and tongue normal; teeth and gums normal   Neck: Supple, symmetrical, trachea midline, no adenopathy;  thyroid: not enlarged, symmetric, no tenderness/mass/nodules; no carotid bruit or JVD   Back:   Symmetric, no curvature, ROM normal, no CVA tenderness   Lungs:   Clear to auscultation bilaterally, respirations unlabored   Heart:  Regular rate and rhythm, S1 and S2 normal, no murmur, rub, or gallop   Abdomen:   Soft, non-tender, bowel sounds active all four quadrants,  no masses, no organomegaly   Pelvic: Deferred   Extremities: Extremities normal, atraumatic, no cyanosis or edema   Pulses: 2+ and symmetric   Skin: Skin color, texture, turgor normal, no rashes or lesions   Lymph nodes: Cervical, supraclavicular, and axillary nodes normal   Neurologic: Normal       /86   Pulse 73   Ht 5' 8\" (1.727 m)   Wt 143 lb (64.9 kg)   SpO2 98%   BMI 21.74 kg/m²  Estimated body mass index is 21.74 kg/m² as calculated from the following:    Height as of this encounter: 5' 8\" (1.727 m).    Weight as of this encounter: 143 lb (64.9 kg).    Medicare Hearing Assessment:   Hearing Screening    Time taken: 1/13/2025 11:12 AM  Screening Method: Finger Rub  Finger Rub Result: Pass         Visual Acuity:   Right Eye Visual Acuity: Uncorrected Right Eye Chart Acuity: 20/30   Left Eye Visual Acuity: Uncorrected Left Eye Chart Acuity: 20/30   Both Eyes Visual Acuity: Uncorrected  Both Eyes Chart Acuity: 20/30            Assessment & Plan:   Iliana Dove is a 59 year old female who presents for a Medicare Assessment.     1- Annual Physical Exam   - annual labs reviewed with the patient  - diet and exercise counseling  - immunizations reviewed. Declines shingles. Had a bad reaction to the first dose    1. Encounter for screening mammogram for malignant neoplasm of breast (Primary)    -     [facility]OLANDA 2D+3D SCREENING BILAT (CPT=77067/54109); Future; Expected date: 01/13/2025    2. Smoker  -   counseled. Trying to quit last three cigrattes/day  -     CT LUNG LD SCREENING(CPT=71271); Future; Expected date: 01/13/2025  3. Pure hypercholesterolemia       - continue statin     4. Generalized anxiety disorder        - on mirtazapine and trazodone. Etablish with psych     5. Postablative hypothyroidism        - on synthroid    The patient indicates understanding of these issues and agrees to the plan.  Reinforced healthy diet, lifestyle, and exercise.      No follow-ups on file.     Emilie Matias MD, 1/13/2025     Supplementary Documentation:   General Health:  In the past six months, have you lost more than 10 pounds without trying?: 2 - No  Has your appetite been poor?: No  Type of Diet: Balanced  How does the patient maintain a good energy level?: Appropriate Exercise;Daily Walks  How would you describe your daily physical activity?: Moderate  How would you describe your current health state?: Fair  How do you maintain positive mental well-being?: Visiting Family;Social Interaction;Puzzles;Games;Visiting Friends  On a scale of 0 to 10, with 0 being no pain and 10 being severe pain, what is your pain level?: 0 - (None)  In the past six months, have you experienced urine leakage?: 0-No  At any time do you feel concerned for the safety/well-being of yourself and/or your children, in your home or elsewhere?: No  Have you had any immunizations at another office such as Influenza, Hepatitis  B, Tetanus, or Pneumococcal?: No    Health Maintenance   Topic Date Due    Asthma Control Test  Never done    Mammogram  Never done    DTaP,Tdap,and Td Vaccines (1 - Tdap) Never done    Colorectal Cancer Screening  01/21/2020    Zoster Vaccines (2 of 2) 11/24/2023    COVID-19 Vaccine (4 - 2024-25 season) 09/01/2024    Annual Physical  09/29/2024    Influenza Vaccine (1) Never done    Tobacco Cessation Counseling  01/01/2025    Annual Depression Screening  Completed    Pneumococcal Vaccine: 50+ Years  Completed    Meningococcal B Vaccine  Aged Out

## 2025-05-06 ENCOUNTER — OFFICE VISIT (OUTPATIENT)
Dept: INTERNAL MEDICINE CLINIC | Facility: CLINIC | Age: 60
End: 2025-05-06

## 2025-05-06 VITALS
BODY MASS INDEX: 20.92 KG/M2 | WEIGHT: 138 LBS | HEART RATE: 98 BPM | SYSTOLIC BLOOD PRESSURE: 133 MMHG | DIASTOLIC BLOOD PRESSURE: 96 MMHG | HEIGHT: 68 IN

## 2025-05-06 DIAGNOSIS — R03.0 ELEVATED BLOOD-PRESSURE READING, WITHOUT DIAGNOSIS OF HYPERTENSION: Primary | ICD-10-CM

## 2025-05-06 DIAGNOSIS — Z02.89 ENCOUNTER FOR COMPLETION OF FORM WITH PATIENT: ICD-10-CM

## 2025-05-06 PROCEDURE — 99213 OFFICE O/P EST LOW 20 MIN: CPT | Performed by: NURSE PRACTITIONER

## 2025-05-06 NOTE — PROGRESS NOTES
Iliana Dove is a 60 year old female.  HPI:   Pt requests completion of 's license form indicating she is a safe . Pt denies any accidents in the past year.   Hx of alcohol abuse. Sober 12 years, denies any alcohol use.   Denies any CP, SOB, HA, dizziness, lightheadedness, vision changes, palpitations, weakness in extremities, fatigue, falls, balance problems. Denies any hospitalizations.   Current Medications[1]   Past Medical History[2]   Social History:  Short Social Hx on File[3]     REVIEW OF SYSTEMS:   Review of Systems   Constitutional:  Negative for activity change, appetite change, chills, diaphoresis, fatigue, fever and unexpected weight change.   HENT:  Negative for congestion.    Eyes:  Negative for visual disturbance.   Respiratory:  Negative for cough, chest tightness, shortness of breath and wheezing.    Cardiovascular:  Negative for chest pain, palpitations and leg swelling.   Gastrointestinal:  Negative for abdominal pain, constipation, diarrhea, nausea and vomiting.   Endocrine: Negative.    Genitourinary:  Negative for difficulty urinating and vaginal bleeding.   Musculoskeletal:  Negative for arthralgias and back pain.   Skin: Negative.    Neurological:  Negative for dizziness, seizures, numbness and headaches.   Hematological: Negative.    Psychiatric/Behavioral: Negative.            EXAM:   BP (!) 133/96 (BP Location: Left arm, Patient Position: Sitting, Cuff Size: adult)   Pulse 98   Ht 5' 8\" (1.727 m)   Wt 138 lb (62.6 kg)   BMI 20.98 kg/m²     Physical Exam  Vitals reviewed.   Constitutional:       General: She is not in acute distress.     Appearance: Normal appearance. She is normal weight. She is not ill-appearing.   HENT:      Head: Normocephalic.   Eyes:      General: No scleral icterus.        Right eye: No discharge.         Left eye: No discharge.      Conjunctiva/sclera: Conjunctivae normal.      Pupils: Pupils are equal, round, and reactive to light.   Neck:       Thyroid: No thyroid mass or thyromegaly.   Cardiovascular:      Rate and Rhythm: Normal rate and regular rhythm.      Pulses: Normal pulses.      Heart sounds: Normal heart sounds.   Pulmonary:      Effort: Pulmonary effort is normal. No respiratory distress.      Breath sounds: Normal breath sounds.   Skin:     General: Skin is warm and dry.      Coloration: Skin is not jaundiced.   Neurological:      General: No focal deficit present.      Mental Status: She is alert and oriented to person, place, and time.      Motor: Motor function is intact.      Gait: Gait normal.   Psychiatric:         Mood and Affect: Mood normal.         Judgment: Judgment normal.            ASSESSMENT AND PLAN:     Assessment & Plan  Elevated blood-pressure reading, without diagnosis of hypertension  -Start checking BP daily and keep log. RTC in 2 weeks for BP check. DASH, hydrate.        Encounter for completion of form with patient  's license form completed, submitted to PCP for cosignature.             The patient indicates understanding of these issues and agrees to the plan.  The patient is asked to return in 2 weeks.     The above note was creating using Dragon speech recognition technology. Please excuse any typos.         [1]   Current Outpatient Medications   Medication Sig Dispense Refill    mirtazapine 7.5 MG Oral Tab Take 2 tablets (15 mg total) by mouth nightly. 90 tablet 0    traZODone 100 MG Oral Tab Take 1 tablet (100 mg total) by mouth nightly. 90 tablet 0    albuterol 108 (90 Base) MCG/ACT Inhalation Aero Soln Inhale 2 puffs into the lungs every 6 (six) hours as needed for Wheezing. 8 g 1    fluticasone propionate 50 MCG/ACT Nasal Suspension 2 sprays by Nasal route 2 (two) times daily. 15.8 mL 1    Omeprazole 40 MG Oral Capsule Delayed Release Take 1 capsule (40 mg total) by mouth daily. 90 capsule 3    rosuvastatin 5 MG Oral Tab Take 1 tablet (5 mg total) by mouth nightly. 90 tablet 3    dicyclomine 10 MG  Oral Cap Take 1 capsule (10 mg total) by mouth 4 (four) times daily as needed. 120 capsule 1    sodium chloride 0.65 % Nasal Solution 2 sprays by Nasal route as needed. 60 mL 0    SYNTHROID 137 MCG Oral Tab Take 137 mcg by mouth daily. 90 tablet 3    diphenhydrAMINE-zinc (BENADRYL EXTRA STRENGTH) 2-0.1 % External Cream Apply 1 Application topically 3 (three) times daily as needed for Itching. 28 g 0   [2]   Past Medical History:   Allergic rhinitis    Anxiety    Asthma (HCC)    Seasonal    Cholecystitis    Colitis    Depression    Deviated septum    Diffuse abdominal pain    Last Assessment & Plan: Formatting of this note might be different from the original. Patient with chronic abdominal pain with negative workup in the past including EGD in 2015. Will prescribe Dicyclomine as suspect there may be some degree of IBS. Will also treat her GERD as below    Diverticulitis    Esophageal reflux    H/O radioactive iodine thyroid ablation    Hernia    History of IBS    Hyperthyroidism    Lesion of mandible    UTI (urinary tract infection)   [3]   Social History  Socioeconomic History    Marital status:    Tobacco Use    Smoking status: Every Day     Current packs/day: 0.03     Types: Cigarettes    Smokeless tobacco: Never    Tobacco comments:     3 cigarettes a day, trying to cut down   Vaping Use    Vaping status: Never Used   Substance and Sexual Activity    Alcohol use: Never    Drug use: Never   Other Topics Concern    Caffeine Concern No    Exercise Yes   Social History Narrative    ** Merged History Encounter **

## 2025-05-08 ENCOUNTER — TELEPHONE (OUTPATIENT)
Dept: INTERNAL MEDICINE CLINIC | Facility: CLINIC | Age: 60
End: 2025-05-08

## 2025-05-08 NOTE — TELEPHONE ENCOUNTER
Patient called to check the status on the complete of a form she left at the office on 05/06/2025.

## 2025-05-12 NOTE — TELEPHONE ENCOUNTER
Form completed. Spoke to patient and informed ready for  in [address].    Copy sent to scanning

## 2025-05-12 NOTE — TELEPHONE ENCOUNTER
Patient is calling to check the status on the completion of her form. She stated her drivers license is now  and she needs the form completed to finish the process of getting her license renewed. Please provide patient with an update.

## 2025-05-13 ENCOUNTER — TELEPHONE (OUTPATIENT)
Dept: INTERNAL MEDICINE CLINIC | Facility: CLINIC | Age: 60
End: 2025-05-13

## 2025-05-13 NOTE — TELEPHONE ENCOUNTER
Patient called stating the DMV paperwork was not filled correctly, the box stating if the patient is fit to drive was not completed, and the paperwork was also not signed by JEROME Varela.     Patient is requesting an updated version of the paperwork for the DMV, or if the patient can return to have it completed.     Please call the patient if able to return today.

## 2025-05-13 NOTE — TELEPHONE ENCOUNTER
Pt handed form ( ) Verified. Pt gave verbal understanding and approved new signed form. With MD Polly signature and BONG Kern signature. Form completed.

## 2025-05-23 ENCOUNTER — NURSE TRIAGE (OUTPATIENT)
Dept: INTERNAL MEDICINE CLINIC | Facility: CLINIC | Age: 60
End: 2025-05-23

## 2025-05-23 NOTE — TELEPHONE ENCOUNTER
Action Requested: Summary for Provider     []  Critical Lab, Recommendations Needed  [] Need Additional Advice  []   FYI    []   Need Orders  [] Need Medications Sent to Pharmacy  []  Other     SUMMARY: states she has had a bad sinus infection and her pain is severe, she is having pain in her teeth and her face. Advised she go to the urgent care, she agreed to the plan.     Reason for call: Sinusitis (/)  Onset: several days    The patient called stating she is having bad sinus pain and congestion, saying her head hurts and her teeth hurt and she is stuffy. No appointments available today, advised she go to the urgent care to be seen. She was agreeable.     Reason for Disposition   SEVERE sinus pain    Protocols used: Sinus Pain and Congestion-A-[place]

## 2025-07-25 RX ORDER — LEVOTHYROXINE SODIUM 137 MCG
137 TABLET ORAL DAILY
Qty: 90 TABLET | Refills: 3 | Status: SHIPPED | OUTPATIENT
Start: 2025-07-25

## 2025-07-26 NOTE — TELEPHONE ENCOUNTER
Refill Passed Per Protocol    Requested Prescriptions   Pending Prescriptions Disp Refills    SYNTHROID 137 MCG Oral Tab [Pharmacy Med Name: SYNTHROID 0.137MG (137MCG) TABLETS] 90 tablet 3     Sig: TAKE 1 TABLET BY MOUTH DAILY       Thyroid Medication Protocol Passed - 7/25/2025 10:28 PM        Passed - TSH in past 12 months        Passed - Last TSH value is normal     Lab Results   Component Value Date    TSH 0.908 01/09/2025                 Passed - In person appointment or virtual visit in the past 12 mos or appointment in next 3 mos     Recent Outpatient Visits              2 months ago [facility] blood-pressure reading, [facility] diagnosis of [facility]    [facility]    6 months ago [facility]    [facility] Visit    7 months ago Acute bronchitis, unspecified organism    [facility]    [facility]    1 year ago Left non-suppurative otitis media    [facility]    Office Visit    1 year ago Spondylolisthesis of lumbar region    [facility]    [facility]                      Passed - Medication is active on med list               Recent Outpatient Visits              2 months ago Elevated blood-pressure reading, without diagnosis of hypertension    [facility]    Office Visit    6 months ago Annual physical exam    [facility] [place] Kagzi, Yasmin Irfan, MD    Office Visit    7 months ago Acute bronchitis, unspecified organism    [facility] [place] Kagzi, Yasmin Irfan, MD    Office Visit    1 year ago Left non-suppurative otitis media     [facility] [address] [place] Nichole, Yasmeen MURRAY, JEROME    Office Visit    1 year ago Spondylolisthesis of lumbar region    [facility], [facility] Nilesh Zeng PA    Telemedicine

## (undated) DEVICE — GLOVE,SURG,SENSICARE,ALOE,LF,PF,7: Brand: MEDLINE

## (undated) DEVICE — REMOVER LOT 4OZ N IRRIG UNSCNT SFT MOIST LIQ

## (undated) DEVICE — PAIN TRAY: Brand: MEDLINE INDUSTRIES, INC.

## (undated) DEVICE — GLOVE SUR 7.5 SENSICARE PIP WHT PWD F

## (undated) DEVICE — BANDAGE ADH 1INX3IN NAT FAB N ADH PD CURAD

## (undated) DEVICE — NEEDLE SPNL 22GA L3.5IN BLK QNCKE STYL DISP

## (undated) DEVICE — SKIN REG/FINE DUAL MARKER, RULER, LABELS: Brand: MEDLINE